# Patient Record
Sex: MALE | Race: WHITE | HISPANIC OR LATINO | URBAN - METROPOLITAN AREA
[De-identification: names, ages, dates, MRNs, and addresses within clinical notes are randomized per-mention and may not be internally consistent; named-entity substitution may affect disease eponyms.]

---

## 2023-04-04 ENCOUNTER — TELEPHONE (OUTPATIENT)
Dept: UROLOGY | Facility: AMBULATORY SURGERY CENTER | Age: 63
End: 2023-04-04

## 2023-04-04 ENCOUNTER — OFFICE VISIT (OUTPATIENT)
Dept: DERMATOLOGY | Facility: CLINIC | Age: 63
End: 2023-04-04

## 2023-04-04 VITALS — BODY MASS INDEX: 43.38 KG/M2 | WEIGHT: 286.2 LBS | HEIGHT: 68 IN | TEMPERATURE: 97.8 F

## 2023-04-04 DIAGNOSIS — R21 RASH: Primary | ICD-10-CM

## 2023-04-04 RX ORDER — GLATIRAMER 40 MG/ML
INJECTION, SOLUTION SUBCUTANEOUS
COMMUNITY
Start: 2020-06-04

## 2023-04-04 RX ORDER — LOSARTAN POTASSIUM 50 MG/1
TABLET ORAL
COMMUNITY
Start: 2023-03-30

## 2023-04-04 RX ORDER — EPINEPHRINE 0.3 MG/.3ML
INJECTION SUBCUTANEOUS
COMMUNITY
Start: 2023-03-24

## 2023-04-04 RX ORDER — ATORVASTATIN CALCIUM 40 MG/1
TABLET, FILM COATED ORAL
COMMUNITY
Start: 2018-04-04

## 2023-04-04 RX ORDER — KETOCONAZOLE 20 MG/ML
SHAMPOO TOPICAL
COMMUNITY
Start: 2018-04-04

## 2023-04-04 RX ORDER — CICLOPIROX 7.7 MG/G
GEL TOPICAL
COMMUNITY
Start: 2017-04-04

## 2023-04-04 RX ORDER — LOSARTAN POTASSIUM 50 MG/1
1 TABLET ORAL DAILY
COMMUNITY
Start: 2021-04-04 | End: 2023-07-05

## 2023-04-04 RX ORDER — TACROLIMUS 1 MG/G
OINTMENT TOPICAL
Qty: 100 G | Refills: 0 | Status: SHIPPED | OUTPATIENT
Start: 2023-04-04

## 2023-04-04 RX ORDER — GLATIRAMER 40 MG/ML
INJECTION, SOLUTION SUBCUTANEOUS
COMMUNITY
Start: 2023-03-20

## 2023-04-04 RX ORDER — ASPIRIN 81 MG/1
81 TABLET, CHEWABLE ORAL
COMMUNITY

## 2023-04-04 NOTE — PROGRESS NOTES
"[facility] Note     Patient Name: Sakshi Lund  Encounter Date: 04/04/23     Have you been cared for by a [facility] Dermatologist in the last 3 years and, if so, which description applies to you? NO  I am considered a \"new\" patient and must complete all patient intake questions  I am MALE/not capable of bearing children  REVIEW OF SYSTEMS:  Have you recently had or currently have any of the following? · Recent fever or chills? No  · Any non-healing wound? No   PAST MEDICAL HISTORY:  Have you personally ever had or currently have any of the following? If \"YES,\" then please provide more detail  · Skin cancer (such as Melanoma, Basal Cell Carcinoma, Squamous Cell Carcinoma? YES, BCC on both sides of nose, removed with MOHS  · Tuberculosis, HIV/AIDS, Hepatitis B or C: No  · Systemic Immunosuppression such as Diabetes, Biologic or Immunotherapy, Chemotherapy, Organ Transplantation, Bone Marrow Transplantation YES, Pre-diabetes and MS  · Radiation Treatment No   FAMILY HISTORY:  Any \"first degree relatives\" (parent, brother, sister, or child) with the following? • Skin Cancer, Pancreatic or Other Cancer? YES, Brother had melanoma and kidney cancer, father had small cell carcinoma and uncle as well   PATIENT EXPERIENCE:    • Do you want the Dermatologist to perform a COMPLETE skin exam today including a clinical examination under the \"bra and underwear\" areas? NO  • If necessary, do we have your permission to call and leave a detailed message on your Preferred Phone number that includes your specific medical information? Yes      Not on File No current outpatient medications on file            • Whom besides the patient is providing clinical information about today's encounter?   o NO ADDITIONAL HISTORIAN (patient alone provided history)    Physical Exam and Assessment/Plan by Diagnosis:    RASH    Physical Exam:  • (Anatomic Location); (Size and Morphological Description); (Differential " Diagnosis):  o Scaly erythematous plaque on the left lateral neck  • Pertinent Positives:  • Pertinent Negatives: Additional History of Present Condition:  Patient is here for rash located on left side of neck  Rash has been present for one month and a half  Patient has used many topicals (Ciclopirox 0 77% Gel) and OTC hydrocortisone to get rid of it but no help  It is itchy at times and does come and go  Patient with history of allergy to mometasone  Patient states today it looks better  Wife states may have been a site of a strap rubbing but otherwise can't think of anything that would touch that area  Assessment and Plan: Does not appear fungal  Appears more eczematous    Based on a thorough discussion of this condition and the management approach to it (including a comprehensive discussion of the known risks, side effects and potential benefits of treatment), the patient (family) agrees to implement the following specific plan:  • Start Tacrolimus 0 1% ointment twice daily for 14 days (chosen due to mometasone allergy)  • Start using Cerave or Cetaphil as a moisturizer twice daily   • Send [facility] message in 14 days with update and photo  • If not resolved, will bring back for biopsy      Scribe Attestation    I,:  Luis Landeros am acting as a scribe while in the presence of the attending physician :       I,:  Valentina Tom MD personally performed the services described in this documentation    as scribed in my presence :

## 2023-04-04 NOTE — TELEPHONE ENCOUNTER
Called and spoke with patient, reports weak stream and b/l flank pain  Pt had chills yesterday but none today  Pt denies nausea or vomiting  Reports for the past 3 days  Reports he has had kidney stones in the past  Pt has history of MS  Appt scheduled

## 2023-04-04 NOTE — PROGRESS NOTES
4/6/2023      Chief Complaint   Patient presents with   • Urinary Frequency         Assessment and Plan    60 y o  male -- New patient    1  Urinary frequency  2  Bilateral flank pain  3  Dysuria  - UA today positive for infection and blood  Will send for culture and micro  Will call with results and antibiotics if needed  - PVR today 37 mL  - CT stone ordered due to symptoms and history of stones  - Return to review  - Call with any questions or concerns in the meantime  - All questions answered; patient understands and agrees with plan     4  Prostate cancer screening  - PSA 0 4 (6/30/22)  - PSA and BECKY in June 2023      History of Present Illness  Ulysses Rodarte is a 60 y o  male new patient here for evaluation of urinary symptoms  Patient with history of nephrolithiasis  Patient states for the past few days he has been having weakened urinary stream, bilateral flank pain, dysuria, urinary frequency  Denies gross hematuria, fever  Did have chills that resolved  Denies prior  surgical manipulation or prior hospitalization  Patient has extensive family history of RCC  Father with prostate cancer  Denies family history of bladder cancer  Denies seeing urology in the past      Review of Systems   Constitutional: Negative for activity change, appetite change, chills and fever  HENT: Negative for congestion and trouble swallowing  Respiratory: Negative for cough and shortness of breath  Cardiovascular: Negative for chest pain, palpitations and leg swelling  Gastrointestinal: Negative for abdominal pain, constipation, diarrhea, nausea and vomiting  Genitourinary: Positive for dysuria, flank pain, frequency and urgency  Negative for difficulty urinating and hematuria  Musculoskeletal: Negative for back pain and gait problem  Skin: Negative for wound  Allergic/Immunologic: Negative for immunocompromised state  Neurological: Negative for dizziness and syncope     Hematological: Does not "bruise/bleed easily  Psychiatric/Behavioral: Negative for confusion  All other systems reviewed and are negative  Vitals  Vitals:    04/06/23 0937   BP: 144/90   Pulse: 66   SpO2: 99%   Weight: 130 kg (286 lb)   Height: 5' 8\" (1 727 m)       Physical Exam  Constitutional:       General: He is not in acute distress  Appearance: Normal appearance  He is not ill-appearing, toxic-appearing or diaphoretic  HENT:      Head: Normocephalic  Nose: No congestion  Eyes:      General: No scleral icterus  Right eye: No discharge  Left eye: No discharge  Conjunctiva/sclera: Conjunctivae normal       Pupils: Pupils are equal, round, and reactive to light  Pulmonary:      Effort: Pulmonary effort is normal    Musculoskeletal:      Cervical back: Normal range of motion  Skin:     General: Skin is warm and dry  Coloration: Skin is not jaundiced or pale  Findings: No bruising, erythema, lesion or rash  Neurological:      General: No focal deficit present  Mental Status: He is alert and oriented to person, place, and time  Mental status is at baseline  Gait: Gait normal    Psychiatric:         Mood and Affect: Mood normal          Behavior: Behavior normal          Thought Content:  Thought content normal          Judgment: Judgment normal            Past History  Past Medical History:   Diagnosis Date   • High blood pressure     2021   • [facility] )     2020     Social History     Socioeconomic History   • Marital status: /Civil Union     Spouse name: None   • Number of children: None   • Years of education: None   • Highest education level: None   Occupational History   • None   Tobacco Use   • Smoking status: Never     Passive exposure: Past   • Smokeless tobacco: Never   Vaping Use   • Vaping Use: Never used   Substance and Sexual Activity   • Alcohol use: Never   • Drug use: Never   • Sexual activity: Yes   Other Topics Concern   • None " Social History Narrative   • None     Social Determinants of Health     Financial Resource Strain: Not on file   Food Insecurity: Not on file   Transportation Needs: Not on file   Physical Activity: Not on file   Stress: Not on file   Social Connections: Not on file   Intimate Partner Violence: Not on file   Housing Stability: Not on file     Social History     Tobacco Use   Smoking Status Never   • Passive exposure: Past   Smokeless Tobacco Never     Family History   Problem Relation Age of Onset   • Cancer Father         clear cell carcinma   • Prostate cancer Father    • Hypertension Father    • Nephrolithiasis Father    • Diabetes Mother    • Hypertension Mother    • Cancer Paternal Uncle         clear cell carcinma   • Cancer Brother         clear cell carcinma   • Hypertension Sister    • Diabetes Sister    • Hyperlipidemia Sister    • Hypertension Sister    • Diabetes Sister    • Hyperlipidemia Sister    • No Known Problems Daughter        The following portions of the patient's history were reviewed and updated as appropriate: allergies, current medications, past medical history, past social history, past surgical history and problem list     Results  Recent Results (from the past 1 hour(s))   POCT urine dip    Collection Time: 04/06/23  9:44 AM   Result Value Ref Range    LEUKOCYTE ESTERASE,UA ++     NITRITE,UA positive     SL AMB POCT UROBILINOGEN 0 2     POCT URINE PROTEIN -      PH,UA 5 0     BLOOD,UA ++     SPECIFIC GRAVITY,UA 1 025     KETONES,UA -     BILIRUBIN,UA -     GLUCOSE, UA -      COLOR,UA Yellow     CLARITY,UA Clear    POCT Measure PVR    Collection Time: 04/06/23 10:07 AM   Result Value Ref Range    POST-VOID RESIDUAL VOLUME, ML POC 37 mL   ]  No results found for: PSA  No results found for: GLUCOSE, CALCIUM, NA, K, CO2, CL, BUN, CREATININE  No results found for: WBC, HGB, HCT, MCV, PLT    Dayna Tate PA-C

## 2023-04-04 NOTE — TELEPHONE ENCOUNTER
Please Triage  New Patient    What is the reason for the patient’s appointment? Pt's wife called and patient has trouble urinating for a few days  Pt states that he has urine but he has irritation and pain in his kidney  No blood in urine  Pt has a history of kidney stones  What office location does the patient prefer? [place]     Imaging/Lab Results: No    Do we accept the patient's insurance or is the patient Self-Pay? Insurance Provider: [address] [place] [address] Type/Number:  Member ID#: Has the patient had any previous Urologist(s)? Yes in [place]     Have patient records been requested? If not are records showing in Epic: yes    Has the patient had any outside testing done? no    Does the patient have a personal history of cancer?  No    Pt can be reached at [phone]

## 2023-04-06 ENCOUNTER — OFFICE VISIT (OUTPATIENT)
Dept: UROLOGY | Facility: CLINIC | Age: 63
End: 2023-04-06

## 2023-04-06 VITALS
BODY MASS INDEX: 43.35 KG/M2 | SYSTOLIC BLOOD PRESSURE: 144 MMHG | WEIGHT: 286 LBS | HEIGHT: 68 IN | HEART RATE: 66 BPM | DIASTOLIC BLOOD PRESSURE: 90 MMHG | OXYGEN SATURATION: 99 %

## 2023-04-06 DIAGNOSIS — R35.0 URINARY FREQUENCY: Primary | ICD-10-CM

## 2023-04-06 DIAGNOSIS — N20.0 NEPHROLITHIASIS: ICD-10-CM

## 2023-04-06 LAB
BACTERIA UR QL AUTO: ABNORMAL /HPF
BILIRUB UR QL STRIP: NEGATIVE
CLARITY UR: ABNORMAL
COLOR UR: YELLOW
GLUCOSE UR STRIP-MCNC: NEGATIVE MG/DL
HGB UR QL STRIP.AUTO: ABNORMAL
KETONES UR STRIP-MCNC: NEGATIVE MG/DL
LEUKOCYTE ESTERASE UR QL STRIP: ABNORMAL
MUCOUS THREADS UR QL AUTO: ABNORMAL
NITRITE UR QL STRIP: POSITIVE
NON-SQ EPI CELLS URNS QL MICRO: ABNORMAL /HPF
PH UR STRIP.AUTO: 6 [PH]
POST-VOID RESIDUAL VOLUME, ML POC: 37 ML
PROT UR STRIP-MCNC: ABNORMAL MG/DL
RBC #/AREA URNS AUTO: ABNORMAL /HPF
SL AMB  POCT GLUCOSE, UA: NORMAL
SL AMB LEUKOCYTE ESTERASE,UA: NORMAL
SL AMB POCT BILIRUBIN,UA: NORMAL
SL AMB POCT BLOOD,UA: NORMAL
SL AMB POCT CLARITY,UA: CLEAR
SL AMB POCT COLOR,UA: YELLOW
SL AMB POCT KETONES,UA: NORMAL
SL AMB POCT NITRITE,UA: POSITIVE
SL AMB POCT PH,UA: 5
SL AMB POCT SPECIFIC GRAVITY,UA: 1.02
SL AMB POCT URINE PROTEIN: NORMAL
SL AMB POCT UROBILINOGEN: 0.2
SP GR UR STRIP.AUTO: 1.02 (ref 1–1.03)
UROBILINOGEN UR STRIP-ACNC: <2 MG/DL
WBC #/AREA URNS AUTO: ABNORMAL /HPF

## 2023-04-06 RX ORDER — ASPIRIN 81 MG/1
TABLET, CHEWABLE ORAL
COMMUNITY
Start: 2002-04-04

## 2023-04-07 ENCOUNTER — TELEPHONE (OUTPATIENT)
Dept: OTHER | Facility: OTHER | Age: 63
End: 2023-04-07

## 2023-04-07 DIAGNOSIS — R35.0 URINARY FREQUENCY: Primary | ICD-10-CM

## 2023-04-07 RX ORDER — CEPHALEXIN 500 MG/1
500 CAPSULE ORAL EVERY 12 HOURS SCHEDULED
Qty: 14 CAPSULE | Refills: 0 | Status: SHIPPED | OUTPATIENT
Start: 2023-04-07 | End: 2023-04-14

## 2023-04-07 NOTE — TELEPHONE ENCOUNTER
Called and spoke to patient  Informed patient abx was sent to pharmacy and may need to be adjusted based on UC final results  Confirmed abx and pharmacy with patient  Patient verbalized understanding

## 2023-04-08 LAB — BACTERIA UR CULT: ABNORMAL

## 2023-04-10 PROBLEM — E66.01 MORBID OBESITY WITH BMI OF 40.0-44.9, ADULT (HCC): Status: ACTIVE | Noted: 2023-04-10

## 2023-04-10 PROBLEM — G35 MULTIPLE SCLEROSIS (HCC): Status: ACTIVE | Noted: 2023-04-10

## 2023-04-10 NOTE — TELEPHONE ENCOUNTER
Patient and wife instructed to continue antibiotics as prescribed per provider  Both expressed understanding  Patient educated on the use of probiotics and adequate hydration

## 2023-04-10 NOTE — TELEPHONE ENCOUNTER
Pt wife calling to see if abx need to be adjusted based on culture results       Please call back at  [phone] or [phone]

## 2023-05-01 ENCOUNTER — NURSE TRIAGE (OUTPATIENT)
Dept: OTHER | Facility: OTHER | Age: 63
End: 2023-05-01

## 2023-05-01 ENCOUNTER — APPOINTMENT (OUTPATIENT)
Dept: LAB | Facility: HOSPITAL | Age: 63
End: 2023-05-01

## 2023-05-01 DIAGNOSIS — R39.89 SUSPECTED UTI: ICD-10-CM

## 2023-05-01 DIAGNOSIS — R21 RASH: ICD-10-CM

## 2023-05-01 RX ORDER — TACROLIMUS 1 MG/G
OINTMENT TOPICAL
Qty: 100 G | Refills: 0 | Status: SHIPPED | OUTPATIENT
Start: 2023-05-01

## 2023-05-01 NOTE — TELEPHONE ENCOUNTER
"Regarding: trouble urinating, back pain, pain with urination  ----- Message from Kristian Garcia sent at 5/1/2023  8:24 AM EDT -----  \" My  is having trouble urinating, he is having pain with urination and he only gets a trickle, he is having lower back pain   He needs an appt today\"    "

## 2023-05-01 NOTE — TELEPHONE ENCOUNTER
"Patient recently treated for kidney stone that he passed at home  Patient states he is now having symptoms of a UTI  Patient c/o low back pain, pain with urination and burning that started 4/26  Patient wants to know if he needs go be seen in office or if he can just be prescribed another antibiotic  Please follow up  Reason for Disposition   Side (flank) or lower back pain present    Answer Assessment - Initial Assessment Questions  1  SYMPTOM: \"What's the main symptom you're concerned about? \" (e g , frequency, incontinence)      Lower back pain, pain with urination, burning    2  ONSET: \"When did the symptoms start? \"      4/26    3  PAIN: \"Is there any pain? \" If Yes, ask: \"How bad is it? \" (Scale: 1-10; mild, moderate, severe)     Moderate pain    4  CAUSE: \"What do you think is causing the symptoms? \"      UTI    5  OTHER SYMPTOMS: \"Do you have any other symptoms? \" (e g , fever, flank pain, blood in urine, pain with urination)      Denies    6  PREGNANCY: \"Is there any chance you are pregnant? \" \"When was your last menstrual period? \"      N/A    Last week passed a small stone    Protocols used: URINARY SYMPTOMS-ADULT-AH    "

## 2023-05-01 NOTE — TELEPHONE ENCOUNTER
Pt;s wife called returned a call to the nurse       Call transferred to [facility]

## 2023-05-02 ENCOUNTER — PROCEDURE VISIT (OUTPATIENT)
Dept: UROLOGY | Facility: CLINIC | Age: 63
End: 2023-05-02

## 2023-05-02 ENCOUNTER — APPOINTMENT (OUTPATIENT)
Dept: LAB | Facility: HOSPITAL | Age: 63
End: 2023-05-02

## 2023-05-02 VITALS
BODY MASS INDEX: 43.19 KG/M2 | DIASTOLIC BLOOD PRESSURE: 88 MMHG | SYSTOLIC BLOOD PRESSURE: 138 MMHG | HEART RATE: 92 BPM | WEIGHT: 285 LBS | HEIGHT: 68 IN

## 2023-05-02 DIAGNOSIS — R39.89 SUSPECTED UTI: Primary | ICD-10-CM

## 2023-05-02 DIAGNOSIS — R39.89 SUSPECTED UTI: ICD-10-CM

## 2023-05-02 DIAGNOSIS — R35.0 URINARY FREQUENCY: ICD-10-CM

## 2023-05-02 LAB
ANION GAP SERPL CALCULATED.3IONS-SCNC: 7 MMOL/L (ref 4–13)
BUN SERPL-MCNC: 21 MG/DL (ref 5–25)
CALCIUM SERPL-MCNC: 9.7 MG/DL (ref 8.4–10.2)
CHLORIDE SERPL-SCNC: 102 MMOL/L (ref 96–108)
CO2 SERPL-SCNC: 30 MMOL/L (ref 21–32)
CREAT SERPL-MCNC: 0.96 MG/DL (ref 0.6–1.3)
GFR SERPL CREATININE-BSD FRML MDRD: 83 ML/MIN/1.73SQ M
GLUCOSE P FAST SERPL-MCNC: 140 MG/DL (ref 65–99)
POST-VOID RESIDUAL VOLUME, ML POC: 33 ML
POTASSIUM SERPL-SCNC: 4.2 MMOL/L (ref 3.5–5.3)
SODIUM SERPL-SCNC: 139 MMOL/L (ref 135–147)

## 2023-05-02 RX ORDER — LOSARTAN POTASSIUM AND HYDROCHLOROTHIAZIDE 12.5; 5 MG/1; MG/1
TABLET ORAL
COMMUNITY
Start: 2023-04-13

## 2023-05-02 RX ORDER — SULFAMETHOXAZOLE AND TRIMETHOPRIM 800; 160 MG/1; MG/1
1 TABLET ORAL EVERY 12 HOURS SCHEDULED
Qty: 14 TABLET | Refills: 0 | Status: SHIPPED | OUTPATIENT
Start: 2023-05-02 | End: 2023-05-09

## 2023-05-02 NOTE — PROGRESS NOTES
"5/2/2023  Leila Loja is a 60 y o  male  [id number]    Diagnosis:  Chief Complaint    Urinary Tract Infection         Patient presents for follow up post void residual managed by our office    Plan:    -follow up as scheduled for CT and follow up appointment  -CT was changed to CT renal protocol due to patient mentioning brother, father, uncle all had renal cell carcinoma  Father and uncle had passed away from disease    -monitor for final urine testing        Assessment:    Patient sent [facility] message yesterday stating he felt bloating and unable to completely empty bladder  Offered appointment today  Patient stated feeling better and urinating well  UA came back nitrate negative but did show innumerable bacteria  Gavin MURRIETA-CASEY placed patient antibiotic, patient may need antibiotics switched once culture is final  Patient agreeable to this  Vitals:    05/02/23 0818   BP: 138/88   Pulse: 92   Weight: 129 kg (285 lb)   Height: 5' 8\" (1 727 m)           Patient voided in the office    Post void residual measured via bladder scanner to be 33 mL    Recent Results (from the past 6 hour(s))   POCT Measure PVR    Collection Time: 05/02/23  8:24 AM   Result Value Ref Range    POST-VOID RESIDUAL VOLUME, ML POC 33 mL         Batool Grimaldo LPN      "

## 2023-05-03 ENCOUNTER — TELEPHONE (OUTPATIENT)
Dept: UROLOGY | Facility: CLINIC | Age: 63
End: 2023-05-03

## 2023-05-03 LAB — BACTERIA UR CULT: ABNORMAL

## 2023-05-03 NOTE — TELEPHONE ENCOUNTER
----- Message from Chet Woods PA-C sent at 5/3/2023 10:54 AM EDT -----  Continue antibiotics as prescribed

## 2023-05-12 ENCOUNTER — TELEPHONE (OUTPATIENT)
Dept: UROLOGY | Facility: CLINIC | Age: 63
End: 2023-05-12

## 2023-05-12 NOTE — TELEPHONE ENCOUNTER
Called pt to cancel his appt for Monday  Because his CT scan was rescheduled due to not being authorized through his insurance  He said it is rescheduled, but it isn't really clear if it is approved for that appt or not - he said he is surprised with his extensive family  history of prostate cancer   He said he also forgot to mention he is subseptale to blood clots   Would he be able to get an update on the authorization of his CT scan scheduled for 6/7/23   Thank you

## 2023-05-29 DIAGNOSIS — R21 RASH: ICD-10-CM

## 2023-05-30 RX ORDER — TACROLIMUS 1 MG/G
OINTMENT TOPICAL
Qty: 100 G | Refills: 0 | Status: SHIPPED | OUTPATIENT
Start: 2023-05-30

## 2023-06-07 ENCOUNTER — HOSPITAL ENCOUNTER (OUTPATIENT)
Dept: CT IMAGING | Facility: CLINIC | Age: 63
Discharge: HOME | End: 2023-06-07
Payer: MEDICARE

## 2023-06-07 DIAGNOSIS — N20.0 NEPHROLITHIASIS: ICD-10-CM

## 2023-06-07 PROCEDURE — G1004 CDSM NDSC: HCPCS

## 2023-06-07 PROCEDURE — 74176 CT ABD & PELVIS W/O CONTRAST: CPT

## 2023-09-16 ENCOUNTER — APPOINTMENT (OUTPATIENT)
Dept: RADIOLOGY | Facility: CLINIC | Age: 63
End: 2023-09-16
Payer: MEDICARE

## 2023-09-16 ENCOUNTER — OFFICE VISIT (OUTPATIENT)
Dept: URGENT CARE | Facility: CLINIC | Age: 63
End: 2023-09-16
Payer: MEDICARE

## 2023-09-16 VITALS
OXYGEN SATURATION: 95 % | TEMPERATURE: 98.1 F | HEART RATE: 79 BPM | SYSTOLIC BLOOD PRESSURE: 153 MMHG | RESPIRATION RATE: 18 BRPM | DIASTOLIC BLOOD PRESSURE: 77 MMHG
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
## 2023-09-16 DIAGNOSIS — M25.561 ACUTE PAIN OF RIGHT KNEE: ICD-10-CM

## 2023-09-16 DIAGNOSIS — M25.561 ACUTE PAIN OF RIGHT KNEE: Primary | ICD-10-CM

## 2023-09-16 PROCEDURE — 73564 X-RAY EXAM KNEE 4 OR MORE: CPT

## 2023-09-16 PROCEDURE — G0463 HOSPITAL OUTPT CLINIC VISIT: HCPCS

## 2023-09-16 PROCEDURE — 99213 OFFICE O/P EST LOW 20 MIN: CPT

## 2023-09-16 RX ORDER — CHLORHEXIDINE GLUCONATE ORAL RINSE 1.2 MG/ML
SOLUTION DENTAL
COMMUNITY
Start: 2023-07-27

## 2023-09-16 NOTE — PROGRESS NOTES
[facility]        NAME: Annette Anne is a 60 y.o. male  : 1960    MRN: [id number]  DATE: 2023  TIME: 9:09 AM    Assessment and Plan   Acute pain of right knee [M25.561]  1. Acute pain of right knee  XR knee 4+ vw right injury    Ambulatory Referral to Orthopedic Surgery      2. Fall, initial encounter  XR knee 4+ vw right injury    Ambulatory Referral to Orthopedic Surgery        Xray appears negative for any fracture. Will follow up with radiologist report when available. Wrapped with ace bandage and given walker. Patient Instructions     Tylenol/Motrin as needed for pain. Ice/elevate. Ace bandage for compression/support. Ortho referral placed. Follow up with PCP in 3-5 days. Proceed to the ER with worsening symptoms. Chief Complaint     Chief Complaint   Patient presents with   • Leg Pain     Yesterday patient fell off of 3 step ladder, fell  about 3ft onto right leg. Having pain to right knee and right side of leg. 10/10 pain when walking on it. Did not hit his head, no LOC. History of Present Illness       The patient presents today with complaints of R knee pain that started yesterday. He states he was on a ladder and fell about 3 feet directly onto his R knee. He is having pain to the R lateral aspect of his knee with radiation up into his lateral thigh and down to his lateral lower leg. Denies skin color changes. He is also having increased pain with weight bearing. He has a history of MS and DVT. Denies calf tenderness. Denies any other injuries including LOC or hitting his head, or history of previous knee surgery, or fracture. Review of Systems   Review of Systems   Constitutional: Negative for chills and fever. Gastrointestinal: Negative for abdominal pain. Musculoskeletal: Positive for arthralgias. Negative for myalgias. Back pain: R knee. Skin: Negative for color change, rash and wound.    Neurological: Negative for dizziness and headaches. Current Medications       Current Outpatient Medications:   •  aspirin 81 mg chewable tablet, Chew 81 mg, Disp: , Rfl:   •  atorvastatin (LIPITOR) 40 mg tablet, , Disp: , Rfl:   •  chlorhexidine (PERIDEX) 0.12 % solution, USE TWICE A DAY AS DIRECTED (DONT SWALLOW), Disp: , Rfl:   •  Cholecalciferol 125 MCG/ML LIQD, Take 5,000 Units by mouth, Disp: , Rfl:   •  Ciclopirox 0.77 % gel, , Disp: , Rfl:   •  EPINEPHrine (EPIPEN) 0.3 mg/0.3 mL SOAJ, , Disp: , Rfl:   •  Glatiramer Acetate 40 MG/ML SOSY, INJECT ONE SYRINGE SUBCUTANEOUSLY 3 TIMES A WEEK AT LEAST 48 HOURS APART. ALLOW TO WARM TO ROOM TEMP FOR 20 MINUTES.  REFRIGERATE., Disp: , Rfl:   •  Glatiramer Acetate 40 MG/ML SOSY, , Disp: , Rfl:   •  ketoconazole (NIZORAL) 2 % shampoo, Apply topically, Disp: , Rfl:   •  losartan-hydrochlorothiazide (HYZAAR) 50-12.5 mg per tablet, , Disp: , Rfl:   •  losartan (COZAAR) 50 mg tablet, , Disp: , Rfl:   •  losartan (COZAAR) 50 mg tablet, Take 1 tablet by mouth daily (Patient not taking: Reported on 9/16/2023), Disp: , Rfl:   •  tacrolimus (PROTOPIC) 0.1 % ointment, APPLY TOPICALLY TWICE DAILY FOR 14 DAYS (Patient not taking: Reported on 9/16/2023), Disp: 100 g, Rfl: 0    Current Allergies     Allergies as of 09/16/2023 - Reviewed 09/16/2023   Allergen Reaction Noted   • Mometasone Other (See Comments) 12/04/2019            The following portions of the patient's history were reviewed and updated as appropriate: allergies, current medications, past family history, past medical history, past social history, past surgical history and problem list.     Past Medical History:   Diagnosis Date   • High blood pressure     2021   • MS (multiple sclerosis) ([address])     2020       Past Surgical History:   Procedure Laterality Date   • BUNIONECTOMY Right     2002   • CYST REMOVAL      1990's   • FL MYELOGRAM LUMBAR  2/4/2020   • NOSTRIL RETAINER INSERTION Bilateral     1990's       Family History   Problem Relation Age of Onset   • Cancer Father         clear cell carcinma   • Prostate cancer Father    • Hypertension Father    • Nephrolithiasis Father    • Diabetes Mother    • Hypertension Mother    • Cancer Paternal Uncle         clear cell carcinma   • Cancer Brother         clear cell carcinma   • Hypertension Sister    • Diabetes Sister    • Hyperlipidemia Sister    • Hypertension Sister    • Diabetes Sister    • Hyperlipidemia Sister    • No Known Problems Daughter          Medications have been verified. Objective   /77   Pulse 79   Temp 98.1 °F (36.7 °C)   Resp 18   SpO2 95%        Physical Exam     Physical Exam  Vitals and nursing note reviewed. Constitutional:       General: He is not in acute distress. Appearance: Normal appearance. He is not ill-appearing. HENT:      Head: Normocephalic and atraumatic. Right Ear: External ear normal.      Left Ear: External ear normal.      Nose: Nose normal.      Mouth/Throat:      Lips: Pink. Mouth: Mucous membranes are moist.   Eyes:      General: Vision grossly intact. Extraocular Movements: Extraocular movements intact. Pupils: Pupils are equal, round, and reactive to light. Cardiovascular:      Rate and Rhythm: Normal rate. Pulmonary:      Effort: Pulmonary effort is normal.   Musculoskeletal:      Cervical back: Normal range of motion. Right knee: No swelling, erythema or ecchymosis. Decreased range of motion. Tenderness present over the lateral joint line. Normal pulse. Left knee: Normal.   Skin:     General: Skin is warm. Findings: No abrasion, bruising, ecchymosis, rash or wound. Neurological:      Mental Status: He is alert and oriented to person, place, and time. Motor: Motor function is intact. Gait: Gait is intact.    Psychiatric:         Attention and Perception: Attention normal.         Mood and Affect: Mood normal.

## 2023-09-16 NOTE — PATIENT INSTRUCTIONS
Tylenol/Motrin as needed for pain. Ice/elevate. Ace bandage for compression/support. Ortho referral placed. Follow up with PCP in 3-5 days. Proceed to the ER with worsening symptoms. Knee Pain   WHAT YOU NEED TO KNOW:   What do I need to know about knee pain? Knee pain may start suddenly, or it may be a long-term problem. You may have pain on the side, front, or back of your knee. You may have knee stiffness and swelling. You may hear popping sounds or feel like your knee is giving way or locking up as you walk. You may feel pain when you sit, stand, walk, or climb up and down stairs. What increases my risk for knee pain? Obesity    A strain or tear in ligaments or tendons    A leg fracture or knee dislocation    Overuse of your knee    Osteoarthritis, rheumatoid arthritis, or gout    An infection, tumor, or cyst in your knee    Shoes that are not supportive, or training on a hard surface    Sports that involve jumping or pivoting on your knee    How is the cause of knee pain diagnosed? Your healthcare provider will examine your knee and ask about your symptoms. Tell your provider when the pain started and what you were doing at the time. Describe the pain, such as sharp, throbbing, or achy. Tell your provider about any knee injury or surgery you had. You may need any of the following:  MRI, CT, or ultrasound  pictures may show an injury, fracture, or tumor. Blood tests  may be used to check the level of inflammation in your blood. The tests may also show signs of infection. Arthroscopy  is a procedure to look inside your knee joint with an arthroscope. An arthroscope is a flexible tube with a light and camera on the end. A knee arthroscopy is usually done to check for disease or damage inside your knee. These problems may be fixed during the procedure. How is knee pain treated? Treatment will depend on the cause of your pain.  You may need any of the following:  NSAIDs  help decrease swelling and pain or fever. This medicine is available with or without a doctor's order. NSAIDs can cause stomach bleeding or kidney problems in certain people. If you take blood thinner medicine, always ask your healthcare provider if NSAIDs are safe for you. Always read the medicine label and follow directions. Acetaminophen  decreases pain and fever. It is available without a doctor's order. Ask how much to take and how often to take it. Follow directions. Read the labels of all other medicines you are using to see if they also contain acetaminophen, or ask your doctor or pharmacist. Acetaminophen can cause liver damage if not taken correctly. Do not use more than 4 grams (4,000 milligrams) total of acetaminophen in one day. Prescription pain medicine  may be given. Ask your healthcare provider how to take this medicine safely. Some prescription pain medicines contain acetaminophen. Do not take other medicines that contain acetaminophen without talking to your healthcare provider. Too much acetaminophen may cause liver damage. Prescription pain medicine may cause constipation. Ask your healthcare provider how to prevent or treat constipation. Steroid injections  may be given into your knee. Steroids reduce inflammation and pain. Surgery  may be used for some injuries, such as to repair a torn ACL. What can I do to manage my symptoms? Rest your knee so it can heal.  Limit activities that increase your pain. Do low-impact exercises, such as walking or swimming. Apply ice to help reduce swelling and pain. Use an ice pack, or put crushed ice in a plastic bag. Cover it with a towel before you apply it to your knee. Apply ice for 15 to 20 minutes every hour, or as directed. Apply compression to help reduce swelling. Use a brace or bandage only as directed. Elevate your knee to help decrease pain and swelling. Elevate your knee while you are sitting or lying down.  Prop your leg on pillows to keep your knee above the level of your heart. Prevent your knee from moving as directed. Your healthcare provider may put on a cast or splint. You may need to wear a leg brace to stabilize your knee. A leg brace can be adjusted to increase your range of motion as your knee heals. What can I do to prevent knee pain? Maintain a healthy weight. Extra weight increases your risk for knee pain. Ask your healthcare provider how much you should weigh. He or she can help you create a safe weight loss plan if you need to lose weight. Exercise or train properly. Use the correct equipment for sports. Wear shoes that provide good support. Check your posture often as you exercise, play sports, or train for an event. This can help prevent stress and strain on your knees. Rest between sessions so you do not overwork your knees. When should I seek immediate care? Your pain is worse, even after treatment. You cannot bend or straighten your leg completely. The swelling around your knee does not go down even with treatment. Your knee is painful and hot to the touch. When should I contact my healthcare provider? You have questions or concerns about your condition or care. CARE AGREEMENT:   You have the right to help plan your care. Learn about your health condition and how it may be treated. Discuss treatment options with your healthcare providers to decide what care you want to receive. You always have the right to refuse treatment. The above information is an  only. It is not intended as medical advice for individual conditions or treatments. Talk to your doctor, nurse or pharmacist before following any medical regimen to see if it is safe and effective for you. © Copyright [employer] 2022 Information is for End User's use only and may not be sold, redistributed or otherwise used for commercial purposes.  All illustrations and images included in CareNotes® are the copyrighted property of [employer] or [address] [place] [address]

## 2023-09-19 ENCOUNTER — OFFICE VISIT (OUTPATIENT)
Dept: OBGYN CLINIC | Facility: CLINIC | Age: 63
End: 2023-09-19
Payer: MEDICARE

## 2023-09-19 VITALS
SYSTOLIC BLOOD PRESSURE: 126 MMHG | HEIGHT: 68 IN | BODY MASS INDEX: 42.98 KG/M2 | WEIGHT: 283.6 LBS | DIASTOLIC BLOOD PRESSURE: 81 MMHG | HEART RATE: 88 BPM

## 2023-09-19 DIAGNOSIS — M25.561 ACUTE PAIN OF RIGHT KNEE: ICD-10-CM
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
## 2023-09-19 DIAGNOSIS — S80.01XA CONTUSION OF RIGHT KNEE, INITIAL ENCOUNTER: Primary | ICD-10-CM

## 2023-09-19 PROCEDURE — 99203 OFFICE O/P NEW LOW 30 MIN: CPT | Performed by: ORTHOPAEDIC SURGERY

## 2023-09-19 NOTE — PROGRESS NOTES
Orthopaedics Office Visit - initial  Patient Visit    ASSESSMENT/PLAN:    Assessment:   Right knee contusion   Ligamentously stable    Plan:     · PT referral was given to patient today  · Continue bracing and rest ice elevate   · If patient continues to have pain over next 2 weeks they may initiate PT       _____________________________________________________  CHIEF COMPLAINT:  Chief Complaint   Patient presents with   • Right Knee - Pain         SUBJECTIVE:  Trang Paul is a 60 y.o. male who presents today for initial evaluation of right knee pain. Patient states on 9/15/23 he fell off 3 step ladder onto his right leg. Patient reports he had sharp burning pain down the lateral aspect of right leg as well as tremendous swelling down the right leg. Patient states he uses a cane to ambulate due to his MS and has been using Ace bandages and a brace that he found online. Patient states steps in knee flexion aggravate his pain and rest alleviates his pain. Patient has been using ice, Tylenol, and rest for treatment. Patient reports no previous injuries or surgeries to the right knee.         PAST MEDICAL HISTORY:  Past Medical History:   Diagnosis Date   • High blood pressure     2021   • MS (multiple sclerosis) ([address])     2020       PAST SURGICAL HISTORY:  Past Surgical History:   Procedure Laterality Date   • BUNIONECTOMY Right     2002   • CYST REMOVAL      1990's   • FL MYELOGRAM LUMBAR  2/4/2020   • NOSTRIL RETAINER INSERTION Bilateral     1990's       FAMILY HISTORY:  Family History   Problem Relation Age of Onset   • Cancer Father         clear cell carcinma   • Prostate cancer Father    • Hypertension Father    • Nephrolithiasis Father    • Diabetes Mother    • Hypertension Mother    • Cancer Paternal Uncle         clear cell carcinma   • Cancer Brother         clear cell carcinma   • Hypertension Sister    • Diabetes Sister    • Hyperlipidemia Sister    • Hypertension Sister    • Diabetes Sister    • Hyperlipidemia Sister    • No Known Problems Daughter        SOCIAL HISTORY:  Social History     Tobacco Use   • Smoking status: Never     Passive exposure: Past   • Smokeless tobacco: Never   Vaping Use   • Vaping Use: Never used   Substance Use Topics   • Alcohol use: Never   • Drug use: Never       MEDICATIONS:    Current Outpatient Medications:   •  aspirin 81 mg chewable tablet, Chew 81 mg, Disp: , Rfl:   •  atorvastatin (LIPITOR) 40 mg tablet, , Disp: , Rfl:   •  chlorhexidine (PERIDEX) 0.12 % solution, USE TWICE A DAY AS DIRECTED (DONT SWALLOW), Disp: , Rfl:   •  Cholecalciferol 125 MCG/ML LIQD, Take 5,000 Units by mouth, Disp: , Rfl:   •  Ciclopirox 0.77 % gel, , Disp: , Rfl:   •  EPINEPHrine (EPIPEN) 0.3 mg/0.3 mL SOAJ, , Disp: , Rfl:   •  Glatiramer Acetate 40 MG/ML SOSY, INJECT ONE SYRINGE SUBCUTANEOUSLY 3 TIMES A WEEK AT LEAST 48 HOURS APART. ALLOW TO WARM TO ROOM TEMP FOR 20 MINUTES. REFRIGERATE., Disp: , Rfl:   •  Glatiramer Acetate 40 MG/ML SOSY, , Disp: , Rfl:   •  ketoconazole (NIZORAL) 2 % shampoo, Apply topically, Disp: , Rfl:   •  losartan-hydrochlorothiazide (HYZAAR) 50-12.5 mg per tablet, , Disp: , Rfl:   •  losartan (COZAAR) 50 mg tablet, , Disp: , Rfl:   •  losartan (COZAAR) 50 mg tablet, Take 1 tablet by mouth daily (Patient not taking: Reported on 9/16/2023), Disp: , Rfl:   •  tacrolimus (PROTOPIC) 0.1 % ointment, APPLY TOPICALLY TWICE DAILY FOR 14 DAYS (Patient not taking: Reported on 9/16/2023), Disp: 100 g, Rfl: 0    ALLERGIES:  Allergies   Allergen Reactions   • Mometasone Other (See Comments)     Throat Irritation       REVIEW OF SYSTEMS:  MSK: Right knee pain  Neuro: Normal  Pertinent items are otherwise noted in HPI. A comprehensive review of systems was otherwise negative.     LABS:  HgA1c: No results found for: "HGBA1C"  BMP:   Lab Results   Component Value Date    CALCIUM 9.7 05/02/2023    K 4.2 05/02/2023    CO2 30 05/02/2023     05/02/2023    BUN 21 05/02/2023 CREATININE 0.96 05/02/2023     CBC: No components found for: "CBC"    _____________________________________________________  PHYSICAL EXAMINATION:  Vital signs: /81   Pulse 88   Ht 5' 8" (1.727 m)   Wt 129 kg (283 lb 9.6 oz)   BMI 43.12 kg/m²   General: No acute distress, awake and alert  Psychiatric: Mood and affect appear appropriate  HEENT: Trachea Midline, No torticollis, no apparent facial trauma  Cardiovascular: No audible murmurs;  Extremities appear perfused  Pulmonary: No audible wheezing or stridor  Skin: No open lesions; see further details (if any) below    MUSCULOSKELETAL EXAMINATION:  Extremities:   Right Knee:  Mild Swelling   TTP LCL and Lateral joint Line  AROM 0-100  PROM 0-110   Stable varus and valgus    _____________________________________________________  STUDIES REVIEWED:  I personally reviewed the images and interpretation is as follows:  Plain films of R knee reveal mild degen changes, no fracture or dislocation    PROCEDURES PERFORMED:  Procedures   none    Scribe Attestation    I,:  Behzad Doty am acting as a scribe while in the presence of the attending physician.:       I,:  Kirill Blanton MD personally performed the services described in this documentation    as scribed in my presence.:

## 2023-10-11 ENCOUNTER — EVALUATION (OUTPATIENT)
Dept: PHYSICAL THERAPY | Facility: CLINIC | Age: 63
End: 2023-10-11
Payer: MEDICARE

## 2023-10-11 DIAGNOSIS — S80.01XD CONTUSION OF RIGHT KNEE, SUBSEQUENT ENCOUNTER: ICD-10-CM

## 2023-10-11 DIAGNOSIS — M25.561 ACUTE PAIN OF RIGHT KNEE: Primary | ICD-10-CM

## 2023-10-11 PROCEDURE — 97161 PT EVAL LOW COMPLEX 20 MIN: CPT

## 2023-10-11 PROCEDURE — 97110 THERAPEUTIC EXERCISES: CPT

## 2023-10-11 NOTE — PROGRESS NOTES
PT Evaluation     Today's date: 10/11/2023  Patient name: Trang Paul  : 1960  MRN: [id number]  Referring provider: Millicent Shearer MD  Dx:   Encounter Diagnosis     ICD-10-CM    1. Acute pain of right knee  M25.561 Ambulatory Referral to Physical Therapy      2. Contusion of right knee, subsequent encounter  S80. 01XD Ambulatory Referral to Physical Therapy                     Assessment  Assessment details: Trang Paul is a 58 y.o. male presenting to physical therapy for an initial evaluation with a MD referral of acute pain of right knee. The patient demonstrates WNL knee ROM and is ambulatory with a SPC which is his baseline function. He does demonstrate decreased hip and knee strength, balance deficits, and mild swelling in the suprapatellar area. He continues to wear his knee brace outside of his home for added stability, however has been able to wean out of this at home. These deficits have limited the patient's ability to complete ADLs, community ambulation, and recreational activities of outdoor walks on inclines/declines. This patient would benefit from OP PT services to address their impairments and functional limitations to maximize functional mobility. The patient was provided a HEP and demonstrated/verbalized understanding it's completion. Impairments: activity intolerance, impaired balance, impaired physical strength, lacks appropriate home exercise program, pain with function and weight-bearing intolerance    Symptom irritability: moderateUnderstanding of Dx/Px/POC: excellent   Prognosis: good    Goals  STG to be achieved in 4 weeks: The patient will report no pain with usual activities. The patient will increase right hip abduction MMT score to at least 4/5 to allow for improved functional mobility. LTG to be achieved by DC: The patient will be independent in comprehensive HEP.    The patient will increase right knee extension and flexion MMT score to [facility] to allow for improved functional mobility. The patient will be able to return to his 2-3 mile walks without pain or difficulty due to his knee. The patient will report improved balance and stability to his baseline before his fall. Plan  Patient would benefit from: skilled physical therapy  Planned modality interventions: thermotherapy: hydrocollator packs, TENS and cryotherapy  Planned therapy interventions: manual therapy, joint mobilization, balance/weight bearing training, neuromuscular re-education, patient education, flexibility, strengthening, stretching, therapeutic activities, therapeutic exercise, gait training and home exercise program  Frequency: 2x week  Duration in weeks: 8  Plan of Care beginning date: 10/11/2023  Plan of Care expiration date: 12/6/2023  Treatment plan discussed with: patient        Subjective Evaluation    History of Present Illness  Mechanism of injury:  Rodrick Cobian presents to therapy with a chief complaint of right knee pain ongoing since a fall off a 3 step ladder on 9/15/23. He presented to Urgent Care on 9/16/23 where they performed Xrays. Imaging revealed "no acute fracture or dislocation. Suprapatellar fullness suggesting at least moderate joint effusion. No significant degenerative changes". He was provided a walker, ace bandage, and referred to orthopedics. He saw orthopedics on 9/19/23 and was advised to continue to use OTC brace, rest, ice, elevate, and use OTC medications as needed for pain. He was also referred to OP PT. He reports that he does have MS and has balance deficits with right sided weakness because of this. At baseline, he ambulates with a SPC due to his balance and is using one at present. He states that his pain is gradually improving, however he does continue to have some pain with pivoting on the right leg. He reports minimal to no difficulty on the stairs at this time due to his knee. He does use bilateral handrails on the stairs because of his balance issues. He notes continued weakness in the knee compared to his baseline strength. He also has swelling just above the right knee cap, however this is largely improved from his swelling immediately after the fall. He has been wearing an OTC knee brace since his fall, however is only wearing it outside of his home at this time. He enjoys doing outdoor walks up/down hills around his home. He has been trying to gradually improve his mileage, with the knee brace on. He was up to 2 miles before his fall and is back to 1.5 miles at this time. Patient Goals  Patient goals for therapy: increased strength  Patient goal: to get my knee back to where it was before the fall  Pain  Current pain ratin  At best pain ratin  At worst pain ratin  Location: lateral aspect of the right knee  Quality: knife-like  Relieving factors: ice, change in position, medications and rest  Aggravating factors: standing, walking and stair climbing  Progression: improved    Social Support  Steps to enter house: yes  Stairs in house: yes   Lives in: multiple-level home  Lives with: spouse    Employment status: not working    Diagnostic Tests  X-ray: normal  Treatments  Previous treatment: medication  Current treatment: physical therapy        Objective     Observations     Additional Observation Details  Mild swelling suprapatellar area on R    Palpation     Right   No palpable tenderness to the distal biceps femoris, distal semimembranosus, distal semitendinosus, lateral gastrocnemius and medial gastrocnemius. Tenderness     Right Knee   No tenderness in the inferior patella, lateral joint line, lateral patella, LCL (distal), LCL (proximal), MCL (distal), MCL (proximal), medial joint line, medial patella, patellar tendon, pes anserinus, popliteal fossa, quadriceps tendon and superior patella.      Active Range of Motion   Left Knee   Flexion: 120 degrees   Extension: 0 degrees     Right Knee   Flexion: 120 degrees   Extension: 0 degrees Mobility   Patellar Mobility:   Left Knee   Hypermobile: left medial, left lateral, left superior and left inferior      Right Knee   Hypermobile: medial, lateral, superior and inferior     Strength/Myotome Testing     Left Hip   Planes of Motion   Flexion: 5  Abduction: 5    Right Hip   Planes of Motion   Flexion: 4-  Abduction: 4-    Left Knee   Flexion: 5  Extension: 5    Right Knee   Flexion: 4  Extension: 4    Tests     Right Knee   Negative anterior drawer, lateral Ricardo, medial Ricardo, patellar compression, valgus stress test at 0 degrees and varus stress test at 0 degrees.      Ambulation   Weight-Bearing Status   Weight-Bearing Status (Left): full weight bearing   Assistive device used: single point cane    Ambulation: Level Surfaces   Ambulation with assistive device: independent             Precautions: MS- Balance Deficits, HTN  Access Code: [email or web address]    POC expires Unit limit Auth  expiration date PT/OT + Visit Limit?   12/6/23 N/A 12/31 BOMN                 Visit/Unit Tracking  AUTH Status:  Date 10/11              BOMN Used 1               Remaining                     Manuals 10/11                                                                Neuro Re-Ed             Tandem stance             R SLS             Zia step overs fwd and lat             Rockerboard taps fwd/bwd, left/right                                                    Ther Ex             Rec bike vs upright bike             Supine SLR HEP            Sidelying SLR HEP            Standing hip 3 ways with TB HEP no TB            Lateral walks with TB             Monster walks with TB             Matrix knee ext             Matrix hamstring curls              Pt education PT POC, HEP, brace weaning            Ther Activity             Sit to stands HEP            Fwd step ups              Lat step ups              Gait Training                                       Modalities

## 2023-10-13 ENCOUNTER — APPOINTMENT (EMERGENCY)
Dept: RADIOLOGY | Facility: HOSPITAL | Age: 63
End: 2023-10-13
Payer: MEDICARE

## 2023-10-13 ENCOUNTER — HOSPITAL ENCOUNTER (EMERGENCY)
Facility: HOSPITAL | Age: 63
Discharge: HOME | End: 2023-10-13
Attending: EMERGENCY MEDICINE
Payer: MEDICARE

## 2023-10-13 VITALS
SYSTOLIC BLOOD PRESSURE: 159 MMHG | HEART RATE: 94 BPM | RESPIRATION RATE: 18 BRPM | TEMPERATURE: 98.5 F | OXYGEN SATURATION: 97 % | DIASTOLIC BLOOD PRESSURE: 80 MMHG

## 2023-10-13 DIAGNOSIS — S43.402A SPRAIN OF LEFT SHOULDER: Primary | ICD-10-CM

## 2023-10-13 PROCEDURE — 71045 X-RAY EXAM CHEST 1 VIEW: CPT

## 2023-10-13 PROCEDURE — 99284 EMERGENCY DEPT VISIT MOD MDM: CPT | Performed by: EMERGENCY MEDICINE

## 2023-10-13 PROCEDURE — 73030 X-RAY EXAM OF SHOULDER: CPT

## 2023-10-13 RX ORDER — LIDOCAINE 50 MG/G
1 PATCH TOPICAL ONCE
Status: DISCONTINUED | OUTPATIENT
Start: 2023-10-13 | End: 2023-10-13 | Stop reason: HOSPADM

## 2023-10-13 RX ORDER — NAPROXEN 500 MG/1
500 TABLET ORAL EVERY 12 HOURS PRN
Qty: 20 TABLET | Refills: 0 | Status: SHIPPED | OUTPATIENT
Start: 2023-10-13

## 2023-10-13 RX ORDER — KETOROLAC TROMETHAMINE 30 MG/ML
30 INJECTION, SOLUTION INTRAMUSCULAR; INTRAVENOUS ONCE
Status: COMPLETED | OUTPATIENT
Start: 2023-10-13 | End: 2023-10-13

## 2023-10-13 RX ORDER — ACETAMINOPHEN 325 MG/1
650 TABLET ORAL ONCE
Status: COMPLETED | OUTPATIENT
Start: 2023-10-13 | End: 2023-10-13

## 2023-10-13 RX ADMIN — ACETAMINOPHEN 650 MG: 325 TABLET, FILM COATED ORAL at 19:46

## 2023-10-13 RX ADMIN — KETOROLAC TROMETHAMINE 30 MG: 30 INJECTION INTRAMUSCULAR; INTRAVENOUS at 19:46

## 2023-10-13 RX ADMIN — LIDOCAINE 1 PATCH: 50 PATCH CUTANEOUS at 19:46

## 2023-10-13 NOTE — ED PROVIDER NOTES
History  Chief Complaint   Patient presents with    Shoulder Injury     Pt c/o left shoulder pain after lifting a bag of pellets today     Patient is a 65-year-old male with past medical history of multiple sclerosis, hypertension, prediabetes, hyperlipidemia, presents to the emergency department for acute left shoulder injury that occurred at 4 PM today. Patient states that he was lifting 40 pound bags of pellets today and when he lifted one of the bags he felt acute pain in the left shoulder radiating into the left upper chest.  Any type of movement of the left shoulder seems to exacerbate the pain especially abduction of the shoulder joint. He has baseline paresthesia of the left hand due to his MS but denies any worsening of this. Denies any obvious deformity of the left arm or any new weakness. He reports certain movements of the shoulder cause some pain in the left trapezius region. Denies any head or neck injury or pain. He denies any difficulty breathing, palpitations, abdominal pain, nausea, vomiting, near syncope or dizziness, cough, URI symptoms, change in bowel habits, urinary symptoms, new focal neurologic deficits. He has not taken anything for pain as of yet. Patient is right-hand dominant. History provided by:  Patient   used: No    Shoulder Injury  Associated symptoms: no back pain, no fever and no neck pain        Prior to Admission Medications   Prescriptions Last Dose Informant Patient Reported? Taking? Cholecalciferol 125 MCG/ML LIQD  Self Yes No   Sig: Take 5,000 Units by mouth   Ciclopirox 0.77 % gel  Self Yes No   EPINEPHrine (EPIPEN) 0.3 mg/0.3 mL SOAJ  Self Yes No   Glatiramer Acetate 40 MG/ML SOSY  Self Yes No   Sig: INJECT ONE SYRINGE SUBCUTANEOUSLY 3 TIMES A WEEK AT LEAST 48 HOURS APART. ALLOW TO WARM TO ROOM TEMP FOR 20 MINUTES. REFRIGERATE.    Glatiramer Acetate 40 MG/ML SOSY  Self Yes No   aspirin 81 mg chewable tablet  Self Yes No   Sig: Chew 81 mg atorvastatin (LIPITOR) 40 mg tablet  Self Yes No   chlorhexidine (PERIDEX) 0.12 % solution   Yes No   Sig: USE TWICE A DAY AS DIRECTED (DONT SWALLOW)   ketoconazole (NIZORAL) 2 % shampoo  Self Yes No   Sig: Apply topically   losartan (COZAAR) 50 mg tablet  Self Yes No   Patient not taking: Reported on 5/2/2023   losartan (COZAAR) 50 mg tablet  Self Yes No   Sig: Take 1 tablet by mouth daily   Patient not taking: Reported on 9/16/2023   losartan-hydrochlorothiazide (HYZAAR) 50-12.5 mg per tablet  Self Yes No   tacrolimus (PROTOPIC) 0.1 % ointment   No No   Sig: APPLY TOPICALLY TWICE DAILY FOR 14 DAYS   Patient not taking: Reported on 9/16/2023      Facility-Administered Medications: None       Past Medical History:   Diagnosis Date    High blood pressure     2021    MS (multiple sclerosis) ([address] )     2020       Past Surgical History:   Procedure Laterality Date    BUNIONECTOMY Right     2002    CYST REMOVAL      1990's    FL MYELOGRAM LUMBAR  2/4/2020    NOSTRIL RETAINER INSERTION Bilateral     1's       Family History   Problem Relation Age of Onset    Cancer Father         clear cell carcinma    Prostate cancer Father     Hypertension Father     Nephrolithiasis Father     Diabetes Mother     Hypertension Mother     Cancer Paternal Uncle         clear cell carcinma    Cancer Brother         clear cell carcinma    Hypertension Sister     Diabetes Sister     Hyperlipidemia Sister     Hypertension Sister     Diabetes Sister     Hyperlipidemia Sister     No Known Problems Daughter      I have reviewed and agree with the history as documented.     E-Cigarette/Vaping    E-Cigarette Use Never User      E-Cigarette/Vaping Substances    Nicotine No     THC No     CBD No     Flavoring No     Other No     Unknown No      Social History     Tobacco Use    Smoking status: Never     Passive exposure: Past    Smokeless tobacco: Never   Vaping Use    Vaping Use: Never used   Substance Use Topics    Alcohol use: Never    Drug use: Never       Review of Systems   Constitutional:  Negative for fever. HENT:  Negative for congestion, ear pain, rhinorrhea and sore throat. Respiratory:  Negative for cough, chest tightness, shortness of breath and wheezing. Cardiovascular:  Negative for chest pain and palpitations. Gastrointestinal:  Negative for abdominal pain, constipation, diarrhea, nausea and vomiting. Genitourinary:  Negative for dysuria, flank pain, frequency and hematuria. Musculoskeletal:  Positive for arthralgias. Negative for back pain, joint swelling, neck pain and neck stiffness. +Left shoulder pain. Skin:  Negative for color change, pallor, rash and wound. Allergic/Immunologic: Negative for immunocompromised state. Neurological:  Negative for dizziness, syncope, weakness, light-headedness, numbness and headaches. Hematological:  Negative for adenopathy. Does not bruise/bleed easily. Psychiatric/Behavioral:  Negative for confusion, decreased concentration and sleep disturbance. All other systems reviewed and are negative. Physical Exam  Physical Exam  Vitals and nursing note reviewed. Constitutional:       General: He is not in acute distress. Appearance: Normal appearance. He is well-developed. He is not ill-appearing, toxic-appearing or diaphoretic. HENT:      Head: Normocephalic and atraumatic. Right Ear: External ear normal.      Left Ear: External ear normal.      Nose: Nose normal.      Mouth/Throat:      Mouth: Mucous membranes are moist.      Pharynx: Oropharynx is clear. Eyes:      Extraocular Movements: Extraocular movements intact. Conjunctiva/sclera: Conjunctivae normal.   Neck:      Vascular: No JVD. Cardiovascular:      Rate and Rhythm: Normal rate and regular rhythm. Pulses: Normal pulses. Heart sounds: Normal heart sounds. No murmur heard. No friction rub. No gallop.    Pulmonary:      Effort: Pulmonary effort is normal. No respiratory distress. Breath sounds: Normal breath sounds. No wheezing, rhonchi or rales. Comments: +Left upper lateral chest wall tenderness (left pectoralis muscle tenderness). Chest:      Chest wall: Tenderness present. Abdominal:      General: There is no distension. Palpations: Abdomen is soft. Tenderness: There is no abdominal tenderness. There is no guarding or rebound. Musculoskeletal:         General: Tenderness present. No deformity. Cervical back: Normal range of motion and neck supple. No rigidity. Comments: No midline cervical or thoracic spine tenderness. There is tenderness in the left upper chest wall as well as left trapezius muscle with trapezius muscle hypertonicity. There is diffuse left shoulder tenderness. No tenderness in the upper arm, elbow, forearm, wrist or hand. Full range of motion of left elbow and wrist joints. Decreased range of motion with shoulder flexion and abduction. Patient only able to abduct to approximately 30 degrees. Patient able to flex to 80 degrees. 2+ radial and PT pulse. Skin:     General: Skin is warm and dry. Coloration: Skin is not pale. Findings: No erythema or rash. Neurological:      General: No focal deficit present. Mental Status: He is alert and oriented to person, place, and time. Mental status is at baseline. Sensory: Sensory deficit present. Motor: No weakness. Comments: Decreased sensation of the left hand which according to patient is his baseline due to MS.    Psychiatric:         Mood and Affect: Mood normal.         Behavior: Behavior normal.         Vital Signs  ED Triage Vitals [10/13/23 1753]   Temperature Pulse Respirations Blood Pressure SpO2   98.5 °F (36.9 °C) 94 18 159/80 97 %      Temp Source Heart Rate Source Patient Position - Orthostatic VS BP Location FiO2 (%)   Oral Monitor Sitting Right arm --      Pain Score       --           Vitals:    10/13/23 1753   BP: 159/80   Pulse: 94   Patient Position - Orthostatic VS: Sitting         Visual Acuity      ED Medications  Medications   lidocaine (LIDODERM) 5 % patch 1 patch (1 patch Topical Medication Applied 10/13/23 1946)   acetaminophen (TYLENOL) tablet 650 mg (650 mg Oral Given 10/13/23 1946)   ketorolac (TORADOL) injection 30 mg (30 mg Intramuscular Given 10/13/23 1946)       Diagnostic Studies  Results Reviewed       None                   XR chest portable   ED Interpretation by Levy Victoria DO (10/13 2013)   No acute abnormality in the chest.      XR shoulder 2+ views LEFT   ED Interpretation by Levy Victoria DO (10/13 1934)   No acute osseous abnormality. Procedures  Procedures         ED Course                               SBIRT 20yo+      Flowsheet Row Most Recent Value   Initial Alcohol Screen: US AUDIT-C     1. How often do you have a drink containing alcohol? 0 Filed at: 10/13/2023 1754   2. How many drinks containing alcohol do you have on a typical day you are drinking? 0 Filed at: 10/13/2023 1754   3a. Male UNDER 65: How often do you have five or more drinks on one occasion? 0 Filed at: 10/13/2023 1754   Audit-C Score 0 Filed at: 10/13/2023 1754   ENID: How many times in the past year have you. .. Used an illegal drug or used a prescription medication for non-medical reasons? Never Filed at: 10/13/2023 1754                      Medical Decision Making  48-year-old male presents to the ED with left shoulder pain radiating into the left upper chest and left trapezius muscle after he was lifting 40 pound bags of pellets. Suspect shoulder sprain, possible labral tear, rotator cuff strain or tear. Will obtain chest x-ray and x-ray of the left shoulder joint. Will treat symptomatically with IM Toradol, Tylenol, lidocaine patch. Will refer to orthopedic surgery.   Will place patient in a sling for comfort but I did advise patient to refrain from using the sling 24/7 and to try to do small slow movements of the shoulder to avoid adhesive capsulitis. Discussed symptomatic management at home and when to return to the ER. Amount and/or Complexity of Data Reviewed  Radiology: ordered and independent interpretation performed. Decision-making details documented in ED Course. Risk  OTC drugs. Prescription drug management. Disposition  Final diagnoses:   Sprain of left shoulder     Time reflects when diagnosis was documented in both MDM as applicable and the Disposition within this note       Time User Action Codes Description Comment    10/13/2023  7:39 PM Christie Austyn Add [C35.293O] Sprain of left shoulder           ED Disposition       ED Disposition   Discharge    Condition   Stable    Date/Time   Fri Oct 13, 2023 1939    [address] [facility] discharge to home/self care.                    Follow-up Information       Follow up With Specialties Details Why Contact Info Additional Information    Damon Dowling, [address], Nurse Practitioner Schedule an appointment as soon as possible for a visit   [address] [facility] [address]  [address] [facility] Orthopedic Surgery Schedule an appointment as soon as possible for a visit   [address] [place] [address]  [place] [facility], [address] [facility], [facility], [place], [address], [address] Orthopedic Surgery Schedule an appointment as soon as possible for a visit   [address] [address]  [address], [address] Emergency Department Emergency Medicine Go to  If symptoms worsen [address] [facility] [address] [facility] Emergency Department, [place], [place], [address]            Patient's Medications   Discharge Prescriptions    NAPROXEN (NAPROSYN) 500 MG TABLET    Take 1 tablet (500 mg total) by mouth every 12 (twelve) hours as needed for mild pain or moderate pain       Start Date: 10/13/2023End Date: --       Order Dose: 500 mg       Quantity: 20 tablet    Refills: 0           PDMP Review       None            ED Provider  Electronically Signed by             Monica Ann DO  10/13/23 2017

## 2023-10-17 ENCOUNTER — OFFICE VISIT (OUTPATIENT)
Dept: PHYSICAL THERAPY | Facility: CLINIC | Age: 63
End: 2023-10-17
Payer: MEDICARE

## 2023-10-17 DIAGNOSIS — M25.561 ACUTE PAIN OF RIGHT KNEE: Primary | ICD-10-CM

## 2023-10-17 DIAGNOSIS — S80.01XD CONTUSION OF RIGHT KNEE, SUBSEQUENT ENCOUNTER: ICD-10-CM

## 2023-10-17 PROCEDURE — 97110 THERAPEUTIC EXERCISES: CPT

## 2023-10-17 NOTE — PROGRESS NOTES
Daily Note     Today's date: 10/17/2023  Patient name: Luis Alfredo  : 1960  MRN: [id number]  Referring provider: Markell Hubbard MD  Dx:   Encounter Diagnosis     ICD-10-CM    1. Acute pain of right knee  M25.561       2. Contusion of right knee, subsequent encounter  S80. 01XD                      Subjective: Patient reports that he was lifting pellets over the weekend and felt a pop in his shoulder. He states that he went to the ED and they did Xrays which did not show any dislocations or fractures. He is scheduled to see ortho about this. He did not have time to try his HEP from the evaluation because of everything going on with his shoulder. Objective: See treatment diary below      Assessment: Tolerated treatment well. Greatly challenged with semi tandem stance balance due to significant balance impairments. Min A required to stabilize for safety and correction of erratic upper body movements. Reviewed HEP with good understanding demonstrated. Patient demonstrated fatigue post treatment, exhibited good technique with therapeutic exercises, and would benefit from continued PT      Plan: Continue per plan of care.       Precautions: MS- Balance Deficits, HTN  Access Code: [email or web address]    POC expires Unit limit Auth  expiration date PT/OT + Visit Limit?   23 N/A  BOMN                 Visit/Unit Tracking  AUTH Status:  Date 10/11 10/17             BOMN Used 1 2              Remaining                     Manuals 10/11 10/17                                                               Neuro Re-Ed             Semi-Tandem stance  2x30"  Min A           Zia step overs fwd and lat             Rockerboard taps fwd/bwd, left/right  B/L UE   CGA   X20 ea dir                                                  Ther Ex             Rec bike vs upright bike  Rec L1 10'           Supine SLR HEP 2x10            Sidelying SLR HEP 2x10           Standing hip 3 ways with TB HEP no TB X10 B/L UE support Lateral walks with TB  GTB x3 laps B/L UE support           Monster walks with TB             Matrix knee ext  30# 2x10           Matrix hamstring curls   40# 2x10           Pt education PT POC, HEP, brace weaning            Ther Activity             Sit to stands HEP            Fwd step ups              Lat step ups              Gait Training                                       Modalities

## 2023-10-19 ENCOUNTER — OFFICE VISIT (OUTPATIENT)
Dept: PHYSICAL THERAPY | Facility: CLINIC | Age: 63
End: 2023-10-19
Payer: MEDICARE

## 2023-10-19 DIAGNOSIS — S80.01XD CONTUSION OF RIGHT KNEE, SUBSEQUENT ENCOUNTER: ICD-10-CM

## 2023-10-19 DIAGNOSIS — M25.561 ACUTE PAIN OF RIGHT KNEE: Primary | ICD-10-CM

## 2023-10-19 PROCEDURE — 97112 NEUROMUSCULAR REEDUCATION: CPT

## 2023-10-19 PROCEDURE — 97110 THERAPEUTIC EXERCISES: CPT

## 2023-10-19 NOTE — PROGRESS NOTES
Daily Note     Today's date: 10/19/2023  Patient name: Marlin Pichardo  : 1960  MRN: [id number]  Referring provider: Denzel Zhong MD  Dx:   Encounter Diagnosis     ICD-10-CM    1. Acute pain of right knee  M25.561       2. Contusion of right knee, subsequent encounter  S80. 01XD           Start Time: 0800  Stop Time: 0846  Total time in clinic (min): 46 minutes    Subjective: Patient states he got a good workout and experienced R knee soreness after last visit and reports compliance with HEP as well. Otherwise, he denies knee pain and offers no new complaints since last session. Post session, patient stated improved balance, steadiness, and coordination compared to last session and is pleased with results of PT. Objective: See treatment diary below      Assessment: Patient tolerated treatment session well without experiencing any adverse effects. Patient demonstrated improved control and coordination with balance exercises compared to last session. Provided close supervision with rockerboard taps as he exhibited improved balance. Most challenged performing semi-tandem balance as he presents with erratic UE movements and increased swaying in all directions. Patient appropriately challenged and fatigued post session and would benefit from continued PT to improve balance, mobility, and function of RLE. Plan: Continue per POC. Increase reps/resistance as tolerated.       Precautions: MS- Balance Deficits, HTN  Access Code: [email or web address]    POC expires Unit limit Auth  expiration date PT/OT + Visit Limit?   23 N/A  BOMN                 Visit/Unit Tracking  AUTH Status:  Date 10/11 10/17 10/19            BOMN Used 1 2 3             Remaining                     Manuals 10/11 10/17 10/19                                                              Neuro Re-Ed             Semi-Tandem stance  2x30"  Min A 2x30"  Min A          Zia step overs fwd and lat             Rockerboard taps fwd/bwd, left/right  B/L UE   CGA   X20 ea dir B/L UE   CGA   X20 ea dir                                                 Ther Ex             Rec bike vs upright bike  Rec L1 10' Rec L1 10'          Supine SLR HEP 2x10  2x10           Sidelying SLR HEP 2x10 2x10          Standing hip 3 ways with TB HEP no TB X10 B/L UE support  2x10 B/L UE support           Lateral walks with TB  GTB x3 laps B/L UE support GTB x3 laps B/L UE support          Monster walks with TB             Matrix knee ext  30# 2x10 30# 2x10          Matrix hamstring curls   40# 2x10 40# 2x10          Pt education PT POC, HEP, brace weaning            Ther Activity             Sit to stands HEP            Fwd step ups    6" x 10          Lat step ups              Gait Training                                       Modalities

## 2023-10-30 ENCOUNTER — OFFICE VISIT (OUTPATIENT)
Dept: PHYSICAL THERAPY | Facility: CLINIC | Age: 63
End: 2023-10-30
Payer: MEDICARE

## 2023-10-30 DIAGNOSIS — S80.01XD CONTUSION OF RIGHT KNEE, SUBSEQUENT ENCOUNTER: ICD-10-CM

## 2023-10-30 DIAGNOSIS — M25.561 ACUTE PAIN OF RIGHT KNEE: Primary | ICD-10-CM

## 2023-10-30 PROCEDURE — 97110 THERAPEUTIC EXERCISES: CPT

## 2023-10-30 PROCEDURE — 97112 NEUROMUSCULAR REEDUCATION: CPT

## 2023-10-30 NOTE — PROGRESS NOTES
Daily Note     Today's date: 10/30/2023  Patient name: Jefferson Lechuga  : 1960  MRN: [id number]  Referring provider: Rabia Amin MD  Dx:   Encounter Diagnosis     ICD-10-CM    1. Acute pain of right knee  M25.561       2. Contusion of right knee, subsequent encounter  S80. 01XD                      Subjective: Patient reports no complaints to his knee. Objective: See treatment diary below      Assessment: Tolerated treatment well. Progressed patient with additional weight for matrix knee extension and hamstring curls as well as reps for step ups to facilitate improved lower extremity strength. Challenged with progressions due to muscular fatigue, however denied pain in knee. Patient demonstrated fatigue post treatment, exhibited good technique with therapeutic exercises, and would benefit from continued PT      Plan: Progress treatment as tolerated.        Precautions: MS- Balance Deficits, HTN  Access Code: [email or web address]    POC expires Unit limit Auth  expiration date PT/OT + Visit Limit?   23 N/A  BOMN                 Visit/Unit Tracking  AUTH Status:  Date 10/11 10/17 10/19 10/30           BOMN Used 1 2 3 4            Remaining                     Manuals 10/11 10/17 10/19 10/30                                                             Neuro Re-Ed             Semi-Tandem stance  2x30"  Min A 2x30"  Min A          Zia step overs fwd and lat    X3 laps U/L UE on HR, CGA         Rockerboard taps fwd/bwd, left/right  B/L UE   CGA   X20 ea dir B/L UE   CGA   X20 ea dir B/L UE   CGA   X20 ea dir                                                Ther Ex             Rec bike vs upright bike  Rec L1 10' Rec L1 10' Rec L1 10'         Supine SLR HEP 2x10  2x10           Sidelying SLR HEP 2x10 2x10          Standing hip 3 ways with TB HEP no TB X10 B/L UE support  2x10 B/L UE support           Lateral walks with TB  GTB x3 laps B/L UE support GTB x3 laps B/L UE support GTB x3 laps B/L UE support Monster walks with TB    GTB x3 laps U/L UE support          Matrix knee ext  30# 2x10 30# 2x10 40# 3x10         Matrix hamstring curls   40# 2x10 40# 2x10 55# 3x10         Pt education PT POC, HEP, brace weaning            Ther Activity             Sit to stands HEP            Fwd step ups    6" x 10 6" x20          Lat step ups     6"x20          Gait Training                                       Modalities

## 2023-11-01 ENCOUNTER — OFFICE VISIT (OUTPATIENT)
Dept: OBGYN CLINIC | Facility: CLINIC | Age: 63
End: 2023-11-01
Payer: MEDICARE

## 2023-11-01 VITALS
WEIGHT: 283 LBS | HEART RATE: 93 BPM | BODY MASS INDEX: 42.89 KG/M2 | HEIGHT: 68 IN | DIASTOLIC BLOOD PRESSURE: 81 MMHG | SYSTOLIC BLOOD PRESSURE: 127 MMHG

## 2023-11-01 DIAGNOSIS — S46.002A ROTATOR CUFF INJURY, LEFT, INITIAL ENCOUNTER: Primary | ICD-10-CM

## 2023-11-01 PROCEDURE — 99213 OFFICE O/P EST LOW 20 MIN: CPT | Performed by: FAMILY MEDICINE

## 2023-11-01 RX ORDER — ALPRAZOLAM 0.5 MG/1
0.5 TABLET ORAL 3 TIMES DAILY PRN
COMMUNITY
Start: 2023-10-27 | End: 2023-11-26

## 2023-11-01 RX ORDER — AMOXICILLIN 500 MG/1
CAPSULE ORAL
COMMUNITY
Start: 2023-10-30

## 2023-11-01 NOTE — PATIENT INSTRUCTIONS
Over the counter vitamins:    -Turmeric vitamin at least 1000 mg daily    - Tart cherry vitamin at least 1000 mg daily    -Glucosamine-chondrointin 2-3 times daily

## 2023-11-01 NOTE — PROGRESS NOTES
Assessment/Plan:  Assessment/Plan   Diagnoses and all orders for this visit:    Rotator cuff injury, left, initial encounter  -     Ambulatory Referral to Orthopedic Surgery  -     MRI shoulder left wo contrast; Future    Other orders  -     ALPRAZolam (XANAX) 0.5 mg tablet; Take 0.5 mg by mouth Three times daily as needed  -     amoxicillin (AMOXIL) 500 mg capsule        50-year-old male [address]   [address] with onset left shoulder pain from a lifting injury on 10/13/2023. Discussed the patient physical exam, radiographs, impression, and plan. Physical exam cervical spine is unremarkable for midline or paraspinal tenderness. He has intact range of motion cervical spine. Axial load and Spurling's are unremarkable. Left shoulder noted for tenderness at the anterior aspect. He has range of motion forward flexion to 150 degrees, abduction 90 degrees, and internal rotation to the lumbar spine. He has 4+/5 strength external rotation and supinators. There is positive Spicer and positive [place]'s, and positive apprehension. He has normal bicep reflex and radial pulse both upper extremities. Clinical impression is that he may have sustained soft tissue injury to the shoulder. At this time I will refer him for MRI of the left shoulder to evaluate for rotator cuff, biceps, and labral tear as invasive management may be warranted. He will return with having MRI done. Subjective:   Patient ID: Trang Paul is a 58 y.o. male. Chief Complaint   Patient presents with    Left Shoulder - Pain        50-year-old male [address]   [address] presents for evaluation of onset left shoulder pain from lifting injury on 10/13/2023. He was lifting a 40 pound bag of pellets when he felt a pop in the left shoulder.   He has pain described as sudden in onset, localized to the anterior aspect, burning and sharp, severe in intensity, worse with moving the arm, associated with limited range of motion, and improved with resting. He denies noticing any bruising. He presented to the emergency room where x-ray evaluation was unremarkable. He was advised to follow-up with orthopedic care. He has been icing, resting, and also doing range of motion exercises at home. He has been refraining from heavy lifting, but has been using resistance bands. He has history of MS and states the numbness in left upper extremity has not changed. Shoulder Pain  This is a new problem. The current episode started 1 to 4 weeks ago. The problem occurs daily. The problem has been waxing and waning. Associated symptoms include arthralgias and numbness. Pertinent negatives include no weakness. Exacerbated by: Arm elevation. He has tried rest, position changes and ice (Home exercise) for the symptoms. The treatment provided mild relief. The following portions of the patient's history were reviewed and updated as appropriate: He  has a past medical history of High blood pressure and MS (multiple sclerosis) ([address]). He is allergic to mometasone. .    Review of Systems   Musculoskeletal:  Positive for arthralgias. Neurological:  Positive for numbness. Negative for weakness. Objective:  Vitals:    11/01/23 1320   BP: 127/81   Pulse: 93   Weight: 128 kg (283 lb)   Height: 5' 8" (1.727 m)      Back Exam     Comments:    Cervical spine  - No tenderness  - Normal range of motion  - Negative axial load  - Negative Spurling's        Right Hand Exam     Muscle Strength   The patient has normal right wrist strength. Other   Pulse: present      Left Hand Exam     Muscle Strength   The patient has normal left wrist strength. Other   Pulse: present      Left Elbow Exam     Tenderness   The patient is experiencing no tenderness. Range of Motion   The patient has normal left elbow ROM. Muscle Strength   The patient has normal left elbow strength (5/5 flexion and extension).       Left Shoulder Exam     Tenderness   Left shoulder tenderness location: Anterior. Range of Motion   Active abduction:  90   Forward flexion:  150   Internal rotation 0 degrees:  Lumbar     Muscle Strength   Abduction: 5/5   Internal rotation: 5/5     Tests   Apprehension: positive  Spicer test: positive     Comments:  4+/5 external rotation and supraspinatus  Positive [place]'s          Strength/Myotome Testing     Left Wrist/Hand   Normal wrist strength    Right Wrist/Hand   Normal wrist strength      Physical Exam  Vitals and nursing note reviewed. Constitutional:       Appearance: Normal appearance. He is well-developed. He is not ill-appearing or diaphoretic. HENT:      Head: Normocephalic and atraumatic. Right Ear: External ear normal.      Left Ear: External ear normal.   Eyes:      Conjunctiva/sclera: Conjunctivae normal.   Neck:      Trachea: No tracheal deviation. Cardiovascular:      Rate and Rhythm: Normal rate. Pulmonary:      Effort: Pulmonary effort is normal. No respiratory distress. Abdominal:      General: There is no distension. Musculoskeletal:         General: Tenderness present. No swelling, deformity or signs of injury. Skin:     General: Skin is warm and dry. Coloration: Skin is not jaundiced or pale. Neurological:      Mental Status: He is alert and oriented to person, place, and time. Psychiatric:         Mood and Affect: Mood normal.         Behavior: Behavior normal.         Thought Content: Thought content normal.         Judgment: Judgment normal.         I have personally reviewed pertinent films in PACS and my interpretation is  .   No acute osseous abnormality left shoulder

## 2023-11-03 ENCOUNTER — OFFICE VISIT (OUTPATIENT)
Dept: PHYSICAL THERAPY | Facility: CLINIC | Age: 63
End: 2023-11-03
Payer: MEDICARE

## 2023-11-03 DIAGNOSIS — M25.561 ACUTE PAIN OF RIGHT KNEE: Primary | ICD-10-CM

## 2023-11-03 DIAGNOSIS — S80.01XD CONTUSION OF RIGHT KNEE, SUBSEQUENT ENCOUNTER: ICD-10-CM

## 2023-11-03 PROCEDURE — 97530 THERAPEUTIC ACTIVITIES: CPT

## 2023-11-03 PROCEDURE — 97112 NEUROMUSCULAR REEDUCATION: CPT

## 2023-11-03 PROCEDURE — 97110 THERAPEUTIC EXERCISES: CPT

## 2023-11-03 NOTE — PROGRESS NOTES
Daily Note     Today's date: 11/3/2023  Patient name: Margaret Wise  : 1960  MRN: [id number]  Referring provider: Sherry Rubio MD  Dx:   Encounter Diagnosis     ICD-10-CM    1. Acute pain of right knee  M25.561       2. Contusion of right knee, subsequent encounter  S80. 01XD                      Subjective: Patient reports that he was kneeling over the weekend to work on his pellet stove and his knee wasn't really bothering him. Objective: See treatment diary below      Assessment: Tolerated treatment well. Progressed hip 3 ways with addition of TB to facilitate improved hip strength. He reported increased challenge and muscular fatigue with progression. Provided with TB for completion at home. Demonstrates improving stability with less reliance on UEs to complete balance exercises. Patient demonstrated fatigue post treatment, exhibited good technique with therapeutic exercises, and would benefit from continued PT      Plan: Progress treatment as tolerated.        Precautions: MS- Balance Deficits, HTN  Access Code: [email or web address]    POC expires Unit limit Auth  expiration date PT/OT + Visit Limit?   23 N/A  BOMN                 Visit/Unit Tracking  AUTH Status:  Date 10/11 10/17 10/19 10/30 11/3          BOMN Used 1 2 3 4 5           Remaining                     Manuals 10/11 10/17 10/19 10/30 11/3                                                            Neuro Re-Ed             Semi-Tandem stance  2x30"  Min A 2x30"  Min A  2x30" min A        Zia step overs fwd and lat    X3 laps U/L UE on HR, CGA X3 laps U/L UE on HR, CGA        Rockerboard taps fwd/bwd, left/right  B/L UE   CGA   X20 ea dir B/L UE   CGA   X20 ea dir B/L UE   CGA   X20 ea dir B/L UE   CS   X20 ea dir        FT EO on foam and off foam     2x30" ea min A                                  Ther Ex             Rec bike vs upright bike for knee ROM  Rec L1 10' Rec L1 10' Rec L1 10' Rec L1 10'        Supine SLR HEP 2x10 2x10           Sidelying SLR HEP 2x10 2x10          Standing hip 3 ways with TB HEP no TB X10 B/L UE support  2x10 B/L UE support   GTB 2x10 ea dir B/L  1 UE support        Lateral walks with TB  GTB x3 laps B/L UE support GTB x3 laps B/L UE support GTB x3 laps B/L UE support GTB x4 laps B/L UE support        Monster walks with TB    GTB x3 laps U/L UE support  GTB x4 laps B/L UE support        Matrix knee ext  30# 2x10 30# 2x10 40# 3x10 40# 3x10        Matrix hamstring curls   40# 2x10 40# 2x10 55# 3x10 55# 3x10        Pt education PT POC, HEP, brace weaning            Ther Activity             Sit to stands HEP            Fwd step ups    6" x 10 6" x20  6"x30        Lat step ups     6"x20  6" x30         Gait Training                                       Modalities

## 2023-11-03 NOTE — PROGRESS NOTES
Daily Note     Today's date: 11/3/2023  Patient name: China Rivera  : 1960  MRN: [id number]  Referring provider: Selene Cameron MD  Dx:   Encounter Diagnosis     ICD-10-CM    1. Acute pain of right knee  M25.561       2. Contusion of right knee, subsequent encounter  S80. 01XD                      Subjective: Patient reports that he was exhausted after his last session with the progressions that were made to his program. He denied muscle soreness and states that he experienced more fatigue. He reports improved control over his balance when walking both in his home and outside his home. Objective: See treatment diary below      Assessment: Tolerated treatment well. Progressed step height this session to facilitate improved lower extremity strength functionally. Patient noted increased difficulty and fatigue with progression. Patient demonstrated fatigue post treatment, exhibited good technique with therapeutic exercises, and would benefit from continued PT      Plan: Progress treatment as tolerated.        Precautions: MS- Balance Deficits, HTN  Access Code: [email or web address]    POC expires Unit limit Auth  expiration date PT/OT + Visit Limit?   23 N/A  BOMN                 Visit/Unit Tracking  AUTH Status:  Date 10/11 10/17 10/19 10/30 11/3 11/6         BOMN Used 1 2 3 4 5 6          Remaining                     Manuals 10/11 10/17 10/19 10/30 11/3 11/6                                                           Neuro Re-Ed             Semi-Tandem stance  2x30"  Min A 2x30"  Min A  2x30" min A        Zia step overs fwd and lat    X3 laps U/L UE on HR, CGA X3 laps U/L UE on HR, CGA 3 laps U/L UE on HR, CGA       Rockerboard taps fwd/bwd, left/right  B/L UE   CGA   X20 ea dir B/L UE   CGA   X20 ea dir B/L UE   CGA   X20 ea dir B/L UE   CS   X20 ea dir B/L UE   CS   X30 ea dir       FT EO on foam and off foam     2x30" ea min A 2x30" ea min A                                 Ther Ex Rec bike vs upright bike for knee ROM  Rec L1 10' Rec L1 10' Rec L1 10' Rec L1 10' Rec L1 10'       Supine SLR HEP 2x10  2x10           Sidelying SLR HEP 2x10 2x10          Standing hip 3 ways with TB HEP no TB X10 B/L UE support  2x10 B/L UE support   GTB 2x10 ea dir B/L  1 UE support        Lateral walks with TB  GTB x3 laps B/L UE support GTB x3 laps B/L UE support GTB x3 laps B/L UE support GTB x4 laps B/L UE support GTB x4 laps B/L UE support       Monster walks with TB    GTB x3 laps U/L UE support  GTB x4 laps B/L UE support GTB x4 laps B/L UE support       Matrix knee ext  30# 2x10 30# 2x10 40# 3x10 40# 3x10 40# 3x10       Matrix hamstring curls   40# 2x10 40# 2x10 55# 3x10 55# 3x10 55# 3x10       Pt education PT POC, HEP, brace weaning            Ther Activity             Sit to stands HEP     x10  No UE       Fwd step ups    6" x 10 6" x20  6"x30 8"x30       Lat step ups     6"x20  6" x30  8"x30        Gait Training                                       Modalities

## 2023-11-06 ENCOUNTER — OFFICE VISIT (OUTPATIENT)
Dept: PHYSICAL THERAPY | Facility: CLINIC | Age: 63
End: 2023-11-06
Payer: MEDICARE

## 2023-11-06 DIAGNOSIS — M25.561 ACUTE PAIN OF RIGHT KNEE: Primary | ICD-10-CM

## 2023-11-06 DIAGNOSIS — S80.01XD CONTUSION OF RIGHT KNEE, SUBSEQUENT ENCOUNTER: ICD-10-CM

## 2023-11-06 PROCEDURE — 97112 NEUROMUSCULAR REEDUCATION: CPT

## 2023-11-06 PROCEDURE — 97530 THERAPEUTIC ACTIVITIES: CPT

## 2023-11-06 PROCEDURE — 97110 THERAPEUTIC EXERCISES: CPT

## 2023-11-09 ENCOUNTER — EVALUATION (OUTPATIENT)
Dept: PHYSICAL THERAPY | Facility: CLINIC | Age: 63
End: 2023-11-09
Payer: MEDICARE

## 2023-11-09 DIAGNOSIS — S80.01XD CONTUSION OF RIGHT KNEE, SUBSEQUENT ENCOUNTER: ICD-10-CM

## 2023-11-09 DIAGNOSIS — M25.561 ACUTE PAIN OF RIGHT KNEE: Primary | ICD-10-CM

## 2023-11-09 PROCEDURE — 97140 MANUAL THERAPY 1/> REGIONS: CPT

## 2023-11-09 PROCEDURE — 97112 NEUROMUSCULAR REEDUCATION: CPT

## 2023-11-09 PROCEDURE — 97110 THERAPEUTIC EXERCISES: CPT

## 2023-11-09 NOTE — PROGRESS NOTES
PT Discharge    Today's date: 2023  Patient name: China Rivera  : 1960  MRN: [id number]  Referring provider: Selene Cameron MD  Dx:   Encounter Diagnosis     ICD-10-CM    1. Acute pain of right knee  M25.561       2. Contusion of right knee, subsequent encounter  S80. 01XD                      Assessment  Assessment details: China Rivear is a 58 y.o. male presenting to physical therapy for a reevaluation with a MD referral of acute pain of right knee. Since his initial evaluation, he reports 100% improvement in his knee. The patient has demonstrated improved right knee strength to [facility], abolished pain, improved gait without antalgia, as well as subjective reports of improved functional mobility to [facility]. He has met his goals, is independent with completing his HEP, and is to be DC from PT services at this time to a comprehensive HEP. He was provided with an updated HEP and verbalized understanding it's completion. Understanding of Dx/Px/POC: excellent   Prognosis: good    Goals  STG to be achieved in 4 weeks: The patient will report no pain with usual activities. MET  The patient will increase right hip abduction MMT score to at least 4/5 to allow for improved functional mobility. MET    LTG to be achieved by DC: The patient will be independent in comprehensive HEP. MET  The patient will increase right knee extension and flexion MMT score to [facility] to allow for improved functional mobility. MET  The patient will be able to return to his 2-3 mile walks without pain or difficulty due to his knee. MET  The patient will report improved balance and stability to his baseline before his fall. MET    Plan  Plan of Care beginning date: 2023  Plan of Care expiration date: 2023  Treatment plan discussed with: patient        Subjective Evaluation    History of Present Illness  Mechanism of injury:  Ingrid Olson reports 100% improvement since beginning PT services.  He reports that his pain is abolished at this time. He says that he has also seen a large improvement in his leg strength as well. He says that he has always struggled with his balance and stability since his diagnosis of MS and feels that through the balance and stability exercises we have done in therapy for his knee, have also helped his overall balance as well. He has purchased several pieces of equipment including rockerboard and foam pad to continue to build upon his progress that he made in PT. He is back to his 2 mile walks again and denies any issues completing these. Patient Goals  Patient goals for therapy: increased strength  Patient goal: to get my knee back to where it was before the fall  Pain  No pain reported  Relieving factors: ice, change in position, medications and rest  Progression: resolved    Social Support  Steps to enter house: yes  Stairs in house: yes   Lives in: multiple-level home  Lives with: spouse    Employment status: not working    Diagnostic Tests  X-ray: normal  Treatments  Previous treatment: medication  Current treatment: physical therapy        Objective     Palpation     Right   No palpable tenderness to the distal biceps femoris, distal semimembranosus, distal semitendinosus, lateral gastrocnemius and medial gastrocnemius. Tenderness     Right Knee   No tenderness in the inferior patella, lateral joint line, lateral patella, LCL (distal), LCL (proximal), MCL (distal), MCL (proximal), medial joint line, medial patella, patellar tendon, pes anserinus, popliteal fossa, quadriceps tendon and superior patella.      Active Range of Motion   Left Knee   Flexion: 120 degrees   Extension: 0 degrees     Right Knee   Flexion: 120 degrees   Extension: 0 degrees     Mobility   Patellar Mobility:   Left Knee   Hypermobile: left medial, left lateral, left superior and left inferior      Right Knee   Hypermobile: medial, lateral, superior and inferior     Strength/Myotome Testing     Left Hip   Planes of Motion   Flexion: 5  Abduction: 5    Right Hip   Planes of Motion   Flexion: 5  Abduction: 5    Left Knee   Flexion: 5  Extension: 5    Right Knee   Flexion: 5  Extension: 5    Tests     Right Knee   Negative anterior drawer, lateral Ricardo, medial Ricardo, patellar compression, valgus stress test at 0 degrees and varus stress test at 0 degrees.      Ambulation   Weight-Bearing Status   Weight-Bearing Status (Left): full weight bearing   Assistive device used: single point cane    Ambulation: Level Surfaces   Ambulation with assistive device: independent             Precautions: MS- Balance Deficits, HTN  Access Code: [email or web address]    POC expires Unit limit Auth  expiration date PT/OT + Visit Limit?   12/6/23 N/A 12/31 BOMN                 Visit/Unit Tracking  AUTH Status:  Date 10/11 10/17 10/19 10/30 11/3 11/6 11/9        BOMN Used 1 2 3 4 5 6 7         Remaining                     Manuals 10/11 10/17 10/19 10/30 11/3 11/6 11/9      Reassessment       BE                                             Neuro Re-Ed             Semi-Tandem stance  2x30"  Min A 2x30"  Min A  2x30" min A        Zia step overs fwd and lat    X3 laps U/L UE on HR, CGA X3 laps U/L UE on HR, CGA 3 laps U/L UE on HR, CGA 3 laps ea dir no HR,   CS      Rockerboard taps fwd/bwd, left/right  B/L UE   CGA   X20 ea dir B/L UE   CGA   X20 ea dir B/L UE   CGA   X20 ea dir B/L UE   CS   X20 ea dir B/L UE   CS   X30 ea dir Finger touch CS x30 ea dir       FT EO on foam and off foam     2x30" ea min A 2x30" ea min A                                 Ther Ex             Rec bike vs upright bike for knee ROM  Rec L1 10' Rec L1 10' Rec L1 10' Rec L1 10' Rec L1 10' Rec L1 10'      Supine SLR HEP 2x10  2x10           Sidelying SLR HEP 2x10 2x10          Standing hip 3 ways with TB HEP no TB X10 B/L UE support  2x10 B/L UE support   GTB 2x10 ea dir B/L  1 UE support        Lateral walks with TB  GTB x3 laps B/L UE support GTB x3 laps B/L UE support GTB x3 em B/L UE support GTB x4 laps B/L UE support GTB x4 laps B/L UE support       Monster walks with TB    GTB x3 laps U/L UE support  GTB x4 laps B/L UE support GTB x4 laps B/L UE support       Matrix knee ext  30# 2x10 30# 2x10 40# 3x10 40# 3x10 40# 3x10 40# 3x10      Matrix hamstring curls   40# 2x10 40# 2x10 55# 3x10 55# 3x10 55# 3x10 55# 3x10      Pt education PT POC, HEP, brace weaning      HEP review       Ther Activity             Sit to stands HEP     x10  No UE       Fwd step ups    6" x 10 6" x20  6"x30 8"x30       Lat step ups     6"x20  6" x30  8"x30        Gait Training                                       Modalities

## 2023-11-12 ENCOUNTER — HOSPITAL ENCOUNTER (OUTPATIENT)
Dept: MRI IMAGING | Facility: HOSPITAL | Age: 63
Discharge: HOME | End: 2023-11-12
Attending: FAMILY MEDICINE
Payer: MEDICARE

## 2023-11-12 ENCOUNTER — HOSPITAL ENCOUNTER (OUTPATIENT)
Dept: RADIOLOGY | Facility: HOSPITAL | Age: 63
Discharge: HOME | End: 2023-11-12
Payer: MEDICARE

## 2023-11-12 DIAGNOSIS — S46.002A ROTATOR CUFF INJURY, LEFT, INITIAL ENCOUNTER: ICD-10-CM

## 2023-11-12 PROCEDURE — G1004 CDSM NDSC: HCPCS

## 2023-11-12 PROCEDURE — 73221 MRI JOINT UPR EXTREM W/O DYE: CPT

## 2023-11-18 ENCOUNTER — OFFICE VISIT (OUTPATIENT)
Dept: OBGYN CLINIC | Facility: CLINIC | Age: 63
End: 2023-11-18
Payer: MEDICARE

## 2023-11-18 VITALS
BODY MASS INDEX: 42.62 KG/M2 | DIASTOLIC BLOOD PRESSURE: 95 MMHG | SYSTOLIC BLOOD PRESSURE: 145 MMHG | HEART RATE: 78 BPM | WEIGHT: 281.2 LBS | HEIGHT: 68 IN

## 2023-11-18 DIAGNOSIS — M75.102 TEAR OF LEFT SUPRASPINATUS TENDON: Primary | ICD-10-CM

## 2023-11-18 DIAGNOSIS — M75.42 SUBACROMIAL IMPINGEMENT OF LEFT SHOULDER: ICD-10-CM

## 2023-11-18 DIAGNOSIS — S46.212D BICEPS STRAIN, LEFT, SUBSEQUENT ENCOUNTER: ICD-10-CM

## 2023-11-18 DIAGNOSIS — S46.812D PARTIAL TEAR OF LEFT SUBSCAPULARIS TENDON, SUBSEQUENT ENCOUNTER: ICD-10-CM

## 2023-11-18 PROCEDURE — 99213 OFFICE O/P EST LOW 20 MIN: CPT | Performed by: FAMILY MEDICINE

## 2023-11-18 PROCEDURE — 20610 DRAIN/INJ JOINT/BURSA W/O US: CPT | Performed by: FAMILY MEDICINE

## 2023-11-18 RX ORDER — BUPIVACAINE HYDROCHLORIDE 2.5 MG/ML
4 INJECTION, SOLUTION INFILTRATION; PERINEURAL
Status: COMPLETED | OUTPATIENT
Start: 2023-11-18 | End: 2023-11-18

## 2023-11-18 RX ORDER — BUPIVACAINE HYDROCHLORIDE 2.5 MG/ML
1 INJECTION, SOLUTION INFILTRATION; PERINEURAL
Status: COMPLETED | OUTPATIENT
Start: 2023-11-18 | End: 2023-11-18

## 2023-11-18 RX ORDER — TRIAMCINOLONE ACETONIDE 40 MG/ML
40 INJECTION, SUSPENSION INTRA-ARTICULAR; INTRAMUSCULAR
Status: COMPLETED | OUTPATIENT
Start: 2023-11-18 | End: 2023-11-18

## 2023-11-18 RX ADMIN — BUPIVACAINE HYDROCHLORIDE 1 ML: 2.5 INJECTION, SOLUTION INFILTRATION; PERINEURAL at 08:15

## 2023-11-18 RX ADMIN — BUPIVACAINE HYDROCHLORIDE 4 ML: 2.5 INJECTION, SOLUTION INFILTRATION; PERINEURAL at 08:15

## 2023-11-18 RX ADMIN — TRIAMCINOLONE ACETONIDE 40 MG: 40 INJECTION, SUSPENSION INTRA-ARTICULAR; INTRAMUSCULAR at 08:15

## 2023-11-18 NOTE — PROGRESS NOTES
Assessment/Plan:  Assessment/Plan   Diagnoses and all orders for this visit:    Tear of left supraspinatus tendon  -     Ambulatory Referral to Physical Therapy; Future  -     Large joint arthrocentesis: L subacromial bursa    Partial tear of left subscapularis tendon, subsequent encounter  -     Ambulatory Referral to Physical Therapy; Future    Biceps strain, left, subsequent encounter  -     Ambulatory Referral to Physical Therapy; Future    Subacromial impingement of left shoulder  -     Ambulatory Referral to Physical Therapy; Future          21-year-old right-hand-dominant male [address] with onset, of left shoulder pain from lifting injury on 10/13/2023. Discussed with patient MRI results, impression, and plan. MRI left shoulder noted for interstitial insertional tear superior bundle of the subscapularis tendon, medial dislocation long head biceps tendon, mild fatty atrophy superior subscapularis muscle, partial thickness partial width articular surface insertional tear posterior supraspinatus tendon without muscle atrophy, mild glenohumeral's arthritis with degenerative type tearing lateral labrum. He has range of motion forward flexion to 160 degrees, abduction 160 degrees, and internal rotation to the lumbar spine. Clinical impression is that he has combination of acute and chronic rotator cuff pathology. I advised patient that his range of motion is improved so we will do trial of conservative management prior to exploring surgical intervention to which he agrees. I administered mixture of 3 cc 0.25% bupivacaine and 1 cc Kenalog to the left shoulder subacromial space without complication. He is to start physical therapy as soon as possible and do home exercises as directed. He will follow-up if no improvement after more than 4 weeks of formal therapy. Subjective:   Patient ID: Trang Paul is a 58 y.o. male.   Chief Complaint   Patient presents with    Left Shoulder - Follow-up        79-year-old right-hand-dominant male [address] following up for onset of left shoulder pain from lifting injury on 10/13/2023. He was last seen by me 2.5 weeks ago at which point he was referred for MRI left shoulder. He has been experiencing pain described as generalized to the shoulder worse at the anterior lateral aspects, nonradiating, worse with twisting the arm, associated with limited range of motion, bothersome at nighttime, and improved with resting or changing position. He states that most the time when resting position he has achy pain that is 2 out of 10, but if moving a certain way pain can be greater than 7 out of 10. Shoulder Pain  The current episode started more than 1 month ago. The problem occurs daily. The problem has been gradually improving. Associated symptoms include arthralgias and weakness. Pertinent negatives include no joint swelling or numbness. Exacerbated by: Arm movement. He has tried rest, position changes and ice for the symptoms. The treatment provided mild relief. Review of Systems   Musculoskeletal:  Positive for arthralgias. Negative for joint swelling. Neurological:  Positive for weakness. Negative for numbness. Objective:  Vitals:    11/18/23 0805   BP: 145/95   Pulse: 78   Weight: 128 kg (281 lb 3.2 oz)   Height: 5' 8" (1.727 m)      Left Shoulder Exam     Range of Motion   Active abduction:  160   Forward flexion:  160   Internal rotation 0 degrees:  Lumbar              Physical Exam  Vitals and nursing note reviewed. Constitutional:       Appearance: Normal appearance. He is well-developed. He is not ill-appearing or diaphoretic. HENT:      Head: Normocephalic and atraumatic. Right Ear: External ear normal.      Left Ear: External ear normal.   Eyes:      Conjunctiva/sclera: Conjunctivae normal.   Neck:      Trachea: No tracheal deviation. Cardiovascular:      Rate and Rhythm: Normal rate.    Pulmonary: Effort: Pulmonary effort is normal. No respiratory distress. Abdominal:      General: There is no distension. Musculoskeletal:         General: No swelling, deformity or signs of injury. Skin:     General: Skin is warm and dry. Coloration: Skin is not jaundiced or pale. Neurological:      Mental Status: He is alert and oriented to person, place, and time. Psychiatric:         Mood and Affect: Mood normal.         Behavior: Behavior normal.         Thought Content: Thought content normal.         Judgment: Judgment normal.         I have personally reviewed pertinent films in PACS and my interpretation is  . Partial-thickness supraspinatus tear without tendon retraction    Large joint arthrocentesis: L subacromial bursa  Universal Protocol:  Consent: Verbal consent obtained. Risks and benefits: risks, benefits and alternatives were discussed  Consent given by: patient  Time out: Immediately prior to procedure a "time out" was called to verify the correct patient, procedure, equipment, support staff and site/side marked as required. Patient understanding: patient states understanding of the procedure being performed  Patient consent: the patient's understanding of the procedure matches consent given  Procedure consent: procedure consent matches procedure scheduled  Relevant documents: relevant documents present and verified  Test results: test results available and properly labeled  Site marked: the operative site was marked  Radiology Images displayed and confirmed.  If images not available, report reviewed: imaging studies available  Required items: required blood products, implants, devices, and special equipment available  Patient identity confirmed: verbally with patient  Supporting Documentation  Indications: pain   Procedure Details  Location: shoulder - L subacromial bursa  Preparation: Patient was prepped and draped in the usual sterile fashion  Needle gauge: 21G 2"  Approach: lateral  Medications administered: 4 mL bupivacaine 0.25 %; 1 mL bupivacaine 0.25 %; 40 mg triamcinolone acetonide 40 mg/mL    Patient tolerance: patient tolerated the procedure well with no immediate complications  Dressing:  Sterile dressing applied

## 2023-12-17 ENCOUNTER — OFFICE VISIT (OUTPATIENT)
Dept: URGENT CARE | Facility: CLINIC | Age: 63
End: 2023-12-17
Payer: MEDICARE

## 2023-12-17 VITALS
OXYGEN SATURATION: 96 % | TEMPERATURE: 98.2 F | RESPIRATION RATE: 18 BRPM | SYSTOLIC BLOOD PRESSURE: 137 MMHG | DIASTOLIC BLOOD PRESSURE: 87 MMHG | HEART RATE: 104 BPM

## 2023-12-17 DIAGNOSIS — J20.9 ACUTE BRONCHITIS, UNSPECIFIED ORGANISM: Primary | ICD-10-CM

## 2023-12-17 PROCEDURE — G0463 HOSPITAL OUTPT CLINIC VISIT: HCPCS

## 2023-12-17 PROCEDURE — 99213 OFFICE O/P EST LOW 20 MIN: CPT

## 2023-12-17 RX ORDER — PREDNISONE 20 MG/1
40 TABLET ORAL DAILY
Qty: 10 TABLET | Refills: 0 | Status: SHIPPED | OUTPATIENT
Start: 2023-12-17 | End: 2023-12-22

## 2023-12-17 RX ORDER — AZITHROMYCIN 250 MG/1
TABLET, FILM COATED ORAL
Qty: 6 TABLET | Refills: 0 | Status: SHIPPED | OUTPATIENT
Start: 2023-12-17 | End: 2023-12-22

## 2023-12-17 NOTE — PROGRESS NOTES
[facility]        NAME: Jesús Sainz is a 63 y.o. male  : 1960    MRN: [id number]  DATE: 2023  TIME: 1:43 PM    Assessment and Plan   Acute bronchitis, unspecified organism [J20.9]  1. Acute bronchitis, unspecified organism  predniSONE 20 mg tablet    azithromycin (ZITHROMAX) 250 mg tablet        Symptoms consistent with bronchitis. Given wheezing symptoms without history will start on steroids. Pt with over 8 days of symptoms and worsening so will cover with antibiotics -- no just dry coughing symptoms. IF worsening return and consider chest xray.    Given education on supportive measures for symptoms in discharge instructions.  Follow up with primary care in 3-5 days.  Go to ER if symptoms get worse.     Patient Instructions     --Take all of the antibiotics and steroids for symptoms.     --Rest, drink plenty of fluids     --For nasal/sinus congestion, you can try steam, warm compresses, saline nasal spray, Neti pot, nasal steroid (Flonase, Nasocort) to be used at bedtime after the saline nasal spray     --For cough, you can take an OTC expectorant such as plain Robitussion or Mucinex. A spoonful of honey at bedtime may also be helpful.    --You should contact your primary care provider and/or go to the ER if your symptoms are not improved or get worse over the next 7 days. This includes new onset fever, localized ear pain, sinus pain, as well as worsening cough, chest pain, shortness of breath, or significant weakness/fatigue.      Chief Complaint     Chief Complaint   Patient presents with    Cough     Productive Cough and congestion x8 days. Reports wheezing when coughing. Yellow drainage from nose         History of Present Illness       Presents with sick symptoms including productive cough, and congestion for 8 days. Noted wheezing with coughing episodes, no history of asthma/COPD. Nasal congestion with yellow drainage. Trying OTC metamucil and robitussin without relief.          Review of Systems   Review of Systems   Constitutional:  Negative for chills and fever.   HENT:  Positive for congestion. Negative for ear pain and sore throat.    Respiratory:  Positive for cough, shortness of breath and wheezing.    Cardiovascular:  Negative for chest pain and palpitations.   Gastrointestinal:  Negative for diarrhea, nausea and vomiting.   Musculoskeletal:  Negative for myalgias.   Skin:  Negative for color change and rash.   Psychiatric/Behavioral:  Negative for confusion.    All other systems reviewed and are negative.        Current Medications       Current Outpatient Medications:     aspirin 81 mg chewable tablet, Chew 81 mg, Disp: , Rfl:     atorvastatin (LIPITOR) 40 mg tablet, , Disp: , Rfl:     azithromycin (ZITHROMAX) 250 mg tablet, Take 2 tablets today then 1 tablet daily x 4 days, Disp: 6 tablet, Rfl: 0    chlorhexidine (PERIDEX) 0.12 % solution, USE TWICE A DAY AS DIRECTED (DONT SWALLOW), Disp: , Rfl:     Cholecalciferol 125 MCG/ML LIQD, Take 5,000 Units by mouth, Disp: , Rfl:     Ciclopirox 0.77 % gel, , Disp: , Rfl:     EPINEPHrine (EPIPEN) 0.3 mg/0.3 mL SOAJ, , Disp: , Rfl:     Glatiramer Acetate 40 MG/ML SOSY, INJECT ONE SYRINGE SUBCUTANEOUSLY 3 TIMES A WEEK AT LEAST 48 HOURS APART. ALLOW TO WARM TO ROOM TEMP FOR 20 MINUTES. REFRIGERATE., Disp: , Rfl:     ketoconazole (NIZORAL) 2 % shampoo, Apply topically, Disp: , Rfl:     losartan-hydrochlorothiazide (HYZAAR) 50-12.5 mg per tablet, , Disp: , Rfl:     predniSONE 20 mg tablet, Take 2 tablets (40 mg total) by mouth daily for 5 days, Disp: 10 tablet, Rfl: 0    tacrolimus (PROTOPIC) 0.1 % ointment, APPLY TOPICALLY TWICE DAILY FOR 14 DAYS, Disp: 100 g, Rfl: 0    ALPRAZolam (XANAX) 0.5 mg tablet, Take 0.5 mg by mouth Three times daily as needed (Patient not taking: Reported on 12/17/2023), Disp: , Rfl:     amoxicillin (AMOXIL) 500 mg capsule, , Disp: , Rfl:     Glatiramer Acetate 40 MG/ML SOSY, , Disp: , Rfl:     losartan  (COZAAR) 50 mg tablet, , Disp: , Rfl:     losartan (COZAAR) 50 mg tablet, Take 1 tablet by mouth daily, Disp: , Rfl:     naproxen (NAPROSYN) 500 mg tablet, Take 1 tablet (500 mg total) by mouth every 12 (twelve) hours as needed for mild pain or moderate pain (Patient not taking: Reported on 11/18/2023), Disp: 20 tablet, Rfl: 0    Current Allergies     Allergies as of 12/17/2023 - Reviewed 12/17/2023   Allergen Reaction Noted    Mometasone Other (See Comments) 12/04/2019            The following portions of the patient's history were reviewed and updated as appropriate: allergies, current medications, past family history, past medical history, past social history, past surgical history and problem list.     Past Medical History:   Diagnosis Date    High blood pressure     2021    MS (multiple sclerosis) (HCC)     2020       Past Surgical History:   Procedure Laterality Date    BUNIONECTOMY Right     2002    CYST REMOVAL      1990's    FL MYELOGRAM LUMBAR  2/4/2020    NOSTRIL RETAINER INSERTION Bilateral     1990's       Family History   Problem Relation Age of Onset    Cancer Father         clear cell carcinma    Prostate cancer Father     Hypertension Father     Nephrolithiasis Father     Diabetes Mother     Hypertension Mother     Cancer Paternal Uncle         clear cell carcinma    Cancer Brother         clear cell carcinma    Hypertension Sister     Diabetes Sister     Hyperlipidemia Sister     Hypertension Sister     Diabetes Sister     Hyperlipidemia Sister     No Known Problems Daughter          Medications have been verified.        Objective   /87   Pulse 104   Temp 98.2 °F (36.8 °C)   Resp 18   SpO2 96%        Physical Exam     Physical Exam  Vitals reviewed.   Constitutional:       General: He is not in acute distress.     Appearance: Normal appearance.   HENT:      Right Ear: Tympanic membrane, ear canal and external ear normal.      Left Ear: Tympanic membrane, ear canal and external ear  normal.      Nose: Nose normal.      Mouth/Throat:      Mouth: Mucous membranes are moist.      Pharynx: No posterior oropharyngeal erythema.   Eyes:      Conjunctiva/sclera: Conjunctivae normal.   Cardiovascular:      Rate and Rhythm: Normal rate and regular rhythm.      Pulses: Normal pulses.      Heart sounds: Normal heart sounds. No murmur heard.  Pulmonary:      Effort: Pulmonary effort is normal. No respiratory distress.      Breath sounds: Wheezing present.   Skin:     General: Skin is warm and dry.   Neurological:      General: No focal deficit present.      Mental Status: He is alert and oriented to person, place, and time.   Psychiatric:         Mood and Affect: Mood normal.         Behavior: Behavior normal.

## 2023-12-17 NOTE — PATIENT INSTRUCTIONS
--Take all of the antibiotics and steroids for symptoms.     --Rest, drink plenty of fluids     --For nasal/sinus congestion, you can try steam, warm compresses, saline nasal spray, Neti pot, nasal steroid (Flonase, Nasocort) to be used at bedtime after the saline nasal spray     --For cough, you can take an OTC expectorant such as plain Robitussion or Mucinex. A spoonful of honey at bedtime may also be helpful.    --You should contact your primary care provider and/or go to the ER if your symptoms are not improved or get worse over the next 7 days. This includes new onset fever, localized ear pain, sinus pain, as well as worsening cough, chest pain, shortness of breath, or significant weakness/fatigue.

## 2023-12-27 ENCOUNTER — OFFICE VISIT (OUTPATIENT)
Dept: FAMILY MEDICINE CLINIC | Facility: CLINIC | Age: 63
End: 2023-12-27
Payer: MEDICARE

## 2023-12-27 VITALS
TEMPERATURE: 96.3 F | BODY MASS INDEX: 42.95 KG/M2 | SYSTOLIC BLOOD PRESSURE: 130 MMHG | OXYGEN SATURATION: 96 % | WEIGHT: 283.4 LBS | DIASTOLIC BLOOD PRESSURE: 84 MMHG | HEART RATE: 112 BPM | HEIGHT: 68 IN

## 2023-12-27 DIAGNOSIS — B34.9 VIRAL SYNDROME: Primary | ICD-10-CM

## 2023-12-27 PROCEDURE — 99214 OFFICE O/P EST MOD 30 MIN: CPT | Performed by: FAMILY MEDICINE

## 2023-12-27 RX ORDER — DEXTROMETHORPHAN HYDROBROMIDE AND PROMETHAZINE HYDROCHLORIDE 15; 6.25 MG/5ML; MG/5ML
5 SYRUP ORAL 4 TIMES DAILY PRN
Qty: 240 ML | Refills: 0 | Status: SHIPPED | OUTPATIENT
Start: 2023-12-27

## 2023-12-27 NOTE — PROGRESS NOTES
Answers submitted by the patient for this visit:  Cough Questionnaire (Submitted on 12/26/2023)  Chief Complaint: Cough  Chronicity: chronic  Onset: 1 to 4 weeks ago  Progression since onset: waxing and waning  Frequency: every few hours  Cough characteristics: productive of sputum  chest pain: Yes  chills: No  ear congestion: No  ear pain: No  fever: No  headaches: No  heartburn: No  hemoptysis: No  myalgias: No  nasal congestion: Yes  postnasal drip: No  rash: No  rhinorrhea: Yes  shortness of breath: No  sore throat: No  sweats: No  weight loss: No  wheezing: Yes  Aggravated by: cold air, dust, stress    Depression Screening and Follow-up Plan: Patient was screened for depression during today's encounter. They screened negative with a PHQ-2 score of 0.      Assessment/Plan:     Chronic Problems:  No problem-specific Assessment & Plan notes found for this encounter.      Visit Diagnosis:  Diagnoses and all orders for this visit:    Viral syndrome  -     promethazine-dextromethorphan (PHENERGAN-DM) 6.25-15 mg/5 mL oral syrup; Take 5 mL by mouth 4 (four) times a day as needed for cough    Resolving  Continue symptomatic care  Cheratussin at nigth for cough, will cause sedation  Increase oral hydration, warm tea with honey, increase nutrition   Adequate rest  Tylenol or Motrin for pain and/or fever  Nasal mist  Humidify room  Use decongestant- Mucinex  Zinc lozenges   Good handwashing  Call for any changes  Subjective:    Patient ID: Jesús Sainz is a 63 y.o. male.    F/u urgent care 12/17   Dx with bronchitis ,   Cough persist , wheezing has stopped   Took additional Zithromax of spouse cough medicine much improvement  Denies wheezing shortness of breath difficulty breathing negative fevers  Past hx of Ms , neg asthma , non smoker         Cough  This is a chronic problem. The current episode started 1 to 4 weeks ago. The problem has been waxing and waning. The problem occurs every few hours. The cough is Productive  "of sputum. Associated symptoms include nasal congestion. Pertinent negatives include no chest pain, chills, ear congestion, ear pain, fever, headaches, heartburn, hemoptysis, myalgias, postnasal drip, rash, rhinorrhea, sore throat, shortness of breath, sweats, weight loss or wheezing. The symptoms are aggravated by cold air, dust and stress. His past medical history is significant for bronchitis. There is no history of asthma, COPD, emphysema, environmental allergies or pneumonia.       The following portions of the patient's history were reviewed and updated as appropriate: allergies, current medications, past family history, past medical history, past social history, past surgical history and problem list.    Review of Systems   Constitutional:  Negative for chills, fever and weight loss.   HENT:  Negative for ear pain, postnasal drip, rhinorrhea and sore throat.    Respiratory:  Positive for cough. Negative for hemoptysis, shortness of breath and wheezing.    Cardiovascular:  Negative for chest pain.   Gastrointestinal:  Negative for heartburn.   Musculoskeletal:  Negative for myalgias.   Skin:  Negative for rash.   Allergic/Immunologic: Negative for environmental allergies.   Neurological:  Negative for headaches.         /84 (BP Location: Left arm, Patient Position: Sitting, Cuff Size: Standard)   Pulse (!) 112   Temp (!) 96.3 °F (35.7 °C) (Tympanic)   Ht 5' 8\" (1.727 m)   Wt 129 kg (283 lb 6.4 oz)   SpO2 96%   BMI 43.09 kg/m²   Social History     Socioeconomic History    Marital status: /Civil Union     Spouse name: Not on file    Number of children: Not on file    Years of education: Not on file    Highest education level: Not on file   Occupational History    Not on file   Tobacco Use    Smoking status: Never     Passive exposure: Past    Smokeless tobacco: Never   Vaping Use    Vaping status: Never Used   Substance and Sexual Activity    Alcohol use: Never    Drug use: Never    Sexual " activity: Yes   Other Topics Concern    Not on file   Social History Narrative    Not on file     Social Determinants of Health     Financial Resource Strain: Not on file   Food Insecurity: Not on file   Transportation Needs: Not on file   Physical Activity: Not on file   Stress: Not on file   Social Connections: Not on file   Intimate Partner Violence: Not on file   Housing Stability: Not on file     Past Medical History:   Diagnosis Date    High blood pressure     2021    MS (multiple sclerosis) (HCC)     2020     Family History   Problem Relation Age of Onset    Cancer Father         clear cell carcinma    Prostate cancer Father     Hypertension Father     Nephrolithiasis Father     Diabetes Mother     Hypertension Mother     Cancer Paternal Uncle         clear cell carcinma    Cancer Brother         clear cell carcinma    Hypertension Sister     Diabetes Sister     Hyperlipidemia Sister     Hypertension Sister     Diabetes Sister     Hyperlipidemia Sister     No Known Problems Daughter      Past Surgical History:   Procedure Laterality Date    BUNIONECTOMY Right     2002    CYST REMOVAL      1990's    FL MYELOGRAM LUMBAR  2/4/2020    NOSTRIL RETAINER INSERTION Bilateral     1990's       Current Outpatient Medications:     aspirin 81 mg chewable tablet, Chew 81 mg, Disp: , Rfl:     atorvastatin (LIPITOR) 40 mg tablet, , Disp: , Rfl:     chlorhexidine (PERIDEX) 0.12 % solution, USE TWICE A DAY AS DIRECTED (DONT SWALLOW), Disp: , Rfl:     Cholecalciferol 125 MCG/ML LIQD, Take 5,000 Units by mouth, Disp: , Rfl:     Ciclopirox 0.77 % gel, , Disp: , Rfl:     EPINEPHrine (EPIPEN) 0.3 mg/0.3 mL SOAJ, , Disp: , Rfl:     Glatiramer Acetate 40 MG/ML SOSY, INJECT ONE SYRINGE SUBCUTANEOUSLY 3 TIMES A WEEK AT LEAST 48 HOURS APART. ALLOW TO WARM TO ROOM TEMP FOR 20 MINUTES. REFRIGERATE., Disp: , Rfl:     ketoconazole (NIZORAL) 2 % shampoo, Apply topically, Disp: , Rfl:     losartan-hydrochlorothiazide (HYZAAR) 50-12.5 mg per  tablet, , Disp: , Rfl:     promethazine-dextromethorphan (PHENERGAN-DM) 6.25-15 mg/5 mL oral syrup, Take 5 mL by mouth 4 (four) times a day as needed for cough, Disp: 240 mL, Rfl: 0    tacrolimus (PROTOPIC) 0.1 % ointment, APPLY TOPICALLY TWICE DAILY FOR 14 DAYS, Disp: 100 g, Rfl: 0    ALPRAZolam (XANAX) 0.5 mg tablet, Take 0.5 mg by mouth Three times daily as needed (Patient not taking: Reported on 12/17/2023), Disp: , Rfl:     amoxicillin (AMOXIL) 500 mg capsule, , Disp: , Rfl:     Glatiramer Acetate 40 MG/ML SOSY, , Disp: , Rfl:     losartan (COZAAR) 50 mg tablet, , Disp: , Rfl:     losartan (COZAAR) 50 mg tablet, Take 1 tablet by mouth daily, Disp: , Rfl:     naproxen (NAPROSYN) 500 mg tablet, Take 1 tablet (500 mg total) by mouth every 12 (twelve) hours as needed for mild pain or moderate pain (Patient not taking: Reported on 11/18/2023), Disp: 20 tablet, Rfl: 0    Allergies   Allergen Reactions    Mometasone Other (See Comments)     Throat Irritation          Lab Review   No visits with results within 2 Month(s) from this visit.   Latest known visit with results is:   Appointment on 05/02/2023   Component Date Value    Sodium 05/02/2023 139     Potassium 05/02/2023 4.2     Chloride 05/02/2023 102     CO2 05/02/2023 30     ANION GAP 05/02/2023 7     BUN 05/02/2023 21     Creatinine 05/02/2023 0.96     Glucose, Fasting 05/02/2023 140 (H)     Calcium 05/02/2023 9.7     eGFR 05/02/2023 83         Imaging: No results found.    Objective:     Physical Exam  Constitutional:       General: He is not in acute distress.     Appearance: He is obese. He is not ill-appearing, toxic-appearing or diaphoretic.   HENT:      Head: Normocephalic and atraumatic.      Right Ear: Tympanic membrane normal.      Left Ear: Tympanic membrane normal.      Nose: Nose normal.   Eyes:      Conjunctiva/sclera: Conjunctivae normal.   Cardiovascular:      Rate and Rhythm: Normal rate.      Pulses: Normal pulses.   Pulmonary:      Effort:  "Pulmonary effort is normal. No respiratory distress.      Breath sounds: No wheezing or rales.   Musculoskeletal:         General: Normal range of motion.      Cervical back: Normal range of motion.   Skin:     General: Skin is warm and dry.   Psychiatric:         Mood and Affect: Mood normal.         Behavior: Behavior normal.         Thought Content: Thought content normal.           There are no Patient Instructions on file for this visit.    OSKAR Swan    Portions of the record may have been created with voice recognition software.  Occasional wrong word or \"sound a like\" substitutions may have occurred due to the inherent limitations of voice recognition software.  Read the chart carefully and recognize, using context, where substitutions have occurred.  "

## 2024-01-02 ENCOUNTER — RA CDI HCC (OUTPATIENT)
Dept: OTHER | Facility: HOSPITAL | Age: 64
End: 2024-01-02

## 2024-01-02 NOTE — PROGRESS NOTES
HCC coding opportunities          Chart Reviewed number of suggestions sent to Provider: 2     Patients Insurance     Medicare Insurance: Medicare        G35  E66.01

## 2024-01-03 ENCOUNTER — OFFICE VISIT (OUTPATIENT)
Dept: FAMILY MEDICINE CLINIC | Facility: CLINIC | Age: 64
End: 2024-01-03
Payer: MEDICARE

## 2024-01-03 VITALS
SYSTOLIC BLOOD PRESSURE: 120 MMHG | TEMPERATURE: 97 F | WEIGHT: 278.8 LBS | OXYGEN SATURATION: 95 % | HEART RATE: 90 BPM | BODY MASS INDEX: 42.25 KG/M2 | DIASTOLIC BLOOD PRESSURE: 82 MMHG | HEIGHT: 68 IN

## 2024-01-03 DIAGNOSIS — I10 ESSENTIAL HYPERTENSION: ICD-10-CM

## 2024-01-03 DIAGNOSIS — R30.0 DYSURIA: ICD-10-CM

## 2024-01-03 DIAGNOSIS — N39.0 URINARY TRACT INFECTION WITHOUT HEMATURIA, SITE UNSPECIFIED: Primary | ICD-10-CM

## 2024-01-03 DIAGNOSIS — G35 MULTIPLE SCLEROSIS (HCC): ICD-10-CM

## 2024-01-03 PROBLEM — I25.10 CAD (CORONARY ARTERY DISEASE): Status: ACTIVE | Noted: 2021-08-30

## 2024-01-03 PROBLEM — E78.5 HYPERLIPIDEMIA LDL GOAL <70: Status: ACTIVE | Noted: 2021-07-06

## 2024-01-03 LAB
SL AMB  POCT GLUCOSE, UA: ABNORMAL
SL AMB LEUKOCYTE ESTERASE,UA: 500
SL AMB POCT BILIRUBIN,UA: ABNORMAL
SL AMB POCT BLOOD,UA: 80
SL AMB POCT CLARITY,UA: ABNORMAL
SL AMB POCT COLOR,UA: ABNORMAL
SL AMB POCT KETONES,UA: ABNORMAL
SL AMB POCT NITRITE,UA: ABNORMAL
SL AMB POCT PH,UA: 6
SL AMB POCT SPECIFIC GRAVITY,UA: 1.02
SL AMB POCT URINE PROTEIN: 1
SL AMB POCT UROBILINOGEN: 3.5

## 2024-01-03 PROCEDURE — 87077 CULTURE AEROBIC IDENTIFY: CPT | Performed by: NURSE PRACTITIONER

## 2024-01-03 PROCEDURE — 87086 URINE CULTURE/COLONY COUNT: CPT | Performed by: NURSE PRACTITIONER

## 2024-01-03 PROCEDURE — 87186 SC STD MICRODIL/AGAR DIL: CPT | Performed by: NURSE PRACTITIONER

## 2024-01-03 PROCEDURE — 99214 OFFICE O/P EST MOD 30 MIN: CPT | Performed by: NURSE PRACTITIONER

## 2024-01-03 PROCEDURE — 81002 URINALYSIS NONAUTO W/O SCOPE: CPT | Performed by: NURSE PRACTITIONER

## 2024-01-03 RX ORDER — SULFAMETHOXAZOLE AND TRIMETHOPRIM 800; 160 MG/1; MG/1
1 TABLET ORAL 2 TIMES DAILY
Qty: 14 TABLET | Refills: 0 | Status: SHIPPED | OUTPATIENT
Start: 2024-01-03 | End: 2024-01-10

## 2024-01-03 NOTE — PATIENT INSTRUCTIONS
Urinary Tract Infection in Men   AMBULATORY CARE:   A urinary tract infection (UTI)  is caused by bacteria that get inside your urinary tract. Your urinary tract includes your kidneys, ureters, bladder, and urethra. A UTI is more common in your lower urinary tract, which includes your bladder and urethra.       Common symptoms include the following:   Urinating more often than usual, leaking urine, or waking from sleep to urinate    Pain or burning when you urinate    Pain or pressure in your lower abdomen or back    Urine that smells bad    Blood in your urine    Seek care immediately if:   You are urinating very little or not at all.    You have a high fever with shaking chills.    You have side or back pain that gets worse.    Call your doctor if:   You have a fever.    You do not feel better after 2 days of taking antibiotics.    You are vomiting.    You have new symptoms, such as blood or pus in your urine.    You have questions or concerns about your condition or care.    Treatment for a UTI  may include medicines to treat a bacterial infection. You may also need medicines to decrease pain and burning, or decrease the urge to urinate often.  Prevent a UTI:   Empty your bladder often.  Urinate and empty your bladder as soon as you feel the need. Do not hold your urine for long periods of time.    Drink liquids as directed.  Ask how much liquid to drink each day and which liquids are best for you. You may need to drink more liquids than usual to help flush out the bacteria. Do not drink alcohol, caffeine, or citrus juices. These can irritate your bladder and increase your symptoms. Your healthcare provider may recommend cranberry juice to help prevent a UTI.    Urinate after you have sex.  This can help flush out bacteria passed during sex.    Do pelvic muscle exercises often.  Pelvic muscle exercises may help you start and stop urinating. Strong pelvic muscles may help you empty your bladder easier. Squeeze  these muscles tightly for 5 seconds like you are trying to hold back urine. Then relax for 5 seconds. Gradually work up to squeezing for 10 seconds. Do 3 sets of 15 repetitions a day, or as directed.    Follow up with your doctor as directed:  Write down your questions so you remember to ask them during your visits.  © Copyright [employer] 2023 Information is for End User's use only and may not be sold, redistributed or otherwise used for commercial purposes.  The above information is an  only. It is not intended as medical advice for individual conditions or treatments. Talk to your doctor, nurse or pharmacist before following any medical regimen to see if it is safe and effective for you.

## 2024-01-03 NOTE — ASSESSMENT & PLAN NOTE
Stable on current treatment.  Follows neurology at [facility].  To continue follow-up as scheduled.

## 2024-01-03 NOTE — ASSESSMENT & PLAN NOTE
Blood pressure managed in office today.  Advised increase hydration, avoidance of excess sodium, to continue on Hyzaar as prescribed.

## 2024-01-03 NOTE — PROGRESS NOTES
Name: Jesús Sainz      : 1960      MRN: [id number]  Encounter Provider: OSKAR Arriola  Encounter Date: 1/3/2024   Encounter department: [facility] [address]    Assessment & Plan     1. Urinary tract infection without hematuria, site unspecified  Comments:  Advised increased hydration with water, frequent urination, avoid bladder irritants like caffeine, Bactrim as prescribed.  Orders:  -     sulfamethoxazole-trimethoprim (BACTRIM DS) 800-160 mg per tablet; Take 1 tablet by mouth 2 (two) times a day for 7 days    2. Dysuria  -     POCT urine dip  -     Urine culture; Future  -     Urine culture    3. Multiple sclerosis (HCC)  Assessment & Plan:  Stable on current treatment.  Follows neurology at [facility].  To continue follow-up as scheduled.      4. Essential hypertension  Assessment & Plan:  Blood pressure managed in office today.  Advised increase hydration, avoidance of excess sodium, to continue on Hyzaar as prescribed.             Subjective      Urinary Tract Infection   This is a new problem. The current episode started in the past 7 days. The problem occurs every urination. The problem has been gradually worsening. The quality of the pain is described as burning. The pain is at a severity of 3/10. The pain is mild. There has been no fever. He is Not sexually active. There is No history of pyelonephritis. Pertinent negatives include no chills, discharge, flank pain, frequency, hematuria, hesitancy, nausea, possible pregnancy, sweats, urgency or vomiting. He has tried nothing for the symptoms. His past medical history is significant for kidney stones and recurrent UTIs. There is no history of catheterization, a single kidney, urinary stasis or a urological procedure.     Review of Systems   Constitutional:  Negative for chills.   HENT:  Negative for sore throat and trouble swallowing.    Respiratory:  Negative for cough and shortness of breath.     Gastrointestinal:  Negative for abdominal pain, nausea and vomiting.   Genitourinary:  Positive for dysuria. Negative for decreased urine volume, flank pain, frequency, hematuria, hesitancy, penile discharge, testicular pain and urgency.   Musculoskeletal:  Negative for arthralgias, back pain and myalgias.   Skin:  Negative for color change and rash.   Neurological:  Negative for dizziness and light-headedness.   Hematological:  Negative for adenopathy.   Psychiatric/Behavioral:  Negative for confusion.        Current Outpatient Medications on File Prior to Visit   Medication Sig    aspirin 81 mg chewable tablet Chew 81 mg    atorvastatin (LIPITOR) 40 mg tablet     chlorhexidine (PERIDEX) 0.12 % solution USE TWICE A DAY AS DIRECTED (DONT SWALLOW)    Cholecalciferol 125 MCG/ML LIQD Take 5,000 Units by mouth    Ciclopirox 0.77 % gel     EPINEPHrine (EPIPEN) 0.3 mg/0.3 mL SOAJ     Glatiramer Acetate 40 MG/ML SOSY INJECT ONE SYRINGE SUBCUTANEOUSLY 3 TIMES A WEEK AT LEAST 48 HOURS APART. ALLOW TO WARM TO ROOM TEMP FOR 20 MINUTES. REFRIGERATE.    ketoconazole (NIZORAL) 2 % shampoo Apply topically    losartan-hydrochlorothiazide (HYZAAR) 50-12.5 mg per tablet     promethazine-dextromethorphan (PHENERGAN-DM) 6.25-15 mg/5 mL oral syrup Take 5 mL by mouth 4 (four) times a day as needed for cough    tacrolimus (PROTOPIC) 0.1 % ointment APPLY TOPICALLY TWICE DAILY FOR 14 DAYS    ALPRAZolam (XANAX) 0.5 mg tablet Take 0.5 mg by mouth Three times daily as needed (Patient not taking: Reported on 12/17/2023)    [DISCONTINUED] amoxicillin (AMOXIL) 500 mg capsule  (Patient not taking: Reported on 12/17/2023)    [DISCONTINUED] Glatiramer Acetate 40 MG/ML SOSY     [DISCONTINUED] losartan (COZAAR) 50 mg tablet  (Patient not taking: Reported on 12/17/2023)    [DISCONTINUED] losartan (COZAAR) 50 mg tablet Take 1 tablet by mouth daily    [DISCONTINUED] naproxen (NAPROSYN) 500 mg tablet Take 1 tablet (500 mg total) by mouth every 12  "(twelve) hours as needed for mild pain or moderate pain (Patient not taking: Reported on 11/18/2023)       Objective     /82 (BP Location: Left arm, Patient Position: Sitting, Cuff Size: Standard)   Pulse 90   Temp (!) 97 °F (36.1 °C) (Tympanic)   Ht 5' 8\" (1.727 m)   Wt 126 kg (278 lb 12.8 oz)   SpO2 95%   BMI 42.39 kg/m²     Physical Exam  Constitutional:       General: He is not in acute distress.     Appearance: Normal appearance. He is not ill-appearing.   HENT:      Head: Normocephalic and atraumatic.      Right Ear: Tympanic membrane, ear canal and external ear normal.      Left Ear: Tympanic membrane, ear canal and external ear normal.      Nose: Nose normal.      Mouth/Throat:      Mouth: Mucous membranes are moist.      Pharynx: Oropharynx is clear.   Eyes:      Conjunctiva/sclera: Conjunctivae normal.      Pupils: Pupils are equal, round, and reactive to light.   Cardiovascular:      Rate and Rhythm: Normal rate and regular rhythm.      Pulses: Normal pulses.      Heart sounds: Normal heart sounds.   Pulmonary:      Effort: Pulmonary effort is normal.      Breath sounds: Normal breath sounds.   Abdominal:      General: Bowel sounds are normal.      Palpations: Abdomen is soft.      Tenderness: There is no abdominal tenderness. There is no right CVA tenderness or left CVA tenderness.   Musculoskeletal:         General: Normal range of motion.      Cervical back: Normal range of motion and neck supple.   Skin:     General: Skin is warm and dry.   Neurological:      General: No focal deficit present.      Mental Status: He is alert and oriented to person, place, and time.   Psychiatric:         Mood and Affect: Mood normal.         Behavior: Behavior normal.       OSKAR Arriola    "

## 2024-01-05 LAB — BACTERIA UR CULT: ABNORMAL

## 2024-01-10 ENCOUNTER — EVALUATION (OUTPATIENT)
Dept: PHYSICAL THERAPY | Facility: CLINIC | Age: 64
End: 2024-01-10
Payer: MEDICARE

## 2024-01-10 DIAGNOSIS — S46.812D PARTIAL TEAR OF LEFT SUBSCAPULARIS TENDON, SUBSEQUENT ENCOUNTER: ICD-10-CM

## 2024-01-10 DIAGNOSIS — S46.212D BICEPS STRAIN, LEFT, SUBSEQUENT ENCOUNTER: ICD-10-CM

## 2024-01-10 DIAGNOSIS — M75.42 SUBACROMIAL IMPINGEMENT OF LEFT SHOULDER: ICD-10-CM

## 2024-01-10 DIAGNOSIS — M75.102 TEAR OF LEFT SUPRASPINATUS TENDON: Primary | ICD-10-CM

## 2024-01-10 PROCEDURE — 97161 PT EVAL LOW COMPLEX 20 MIN: CPT

## 2024-01-10 PROCEDURE — 97110 THERAPEUTIC EXERCISES: CPT

## 2024-01-10 NOTE — PROGRESS NOTES
----- Message from Annabel Bryant sent at 4/16/2021 11:19 AM CDT -----  Regarding: FW: Kidney stone treatment  Schedule with Eliseo for 6 months with a PSA recheck.     Dr. Carlos for a VV on 4/19/21 at 3:30 to discuss stone  ----- Message -----  From: Paola Cardona RN  Sent: 4/16/2021  11:13 AM CDT  To: [facility]  Subject: FW: Kidney stone treatment                       Please call and schedule this patient with Dr. Carlos for a VV on 4/19/21 at 3:30 to discuss stone management. Thank you  ----- Message -----  From: Kenton Carlos MD  Sent: 4/6/2021  To: Paola Cardona RN  Subject: FW: Kidney stone treatment                       Paola,  Can you please reschedule him. Papito Hernandez.  ----- Message -----  From: Angelika Muller RN  Sent: 1/22/2021   4:23 PM CDT  To: Kenton Carlos MD, #  Subject: Kidney stone treatment                           Hello-    I am Red's heart transplant coordinator.  I am reaching out to inquire about a plan for his kidney stone treatment.  He was seen by Dr. Carlos last Fall to discuss treatment, but had just been hospitalized with pneumonia.  He also has an elevated PSA.    Red has recovered from the pneumonia, and is able to undergo any procedures now to treat his kidney stones.  Thank You!  Angelika Muller RN  Post-Heart Transplant Coordinator           PT Evaluation     Today's date: 1/10/2024  Patient name: Jesús Sainz  : 1960  MRN: [id number]  Referring provider: Balwinder Linares*  Dx:   Encounter Diagnosis     ICD-10-CM    1. Tear of left supraspinatus tendon  M75.102 Ambulatory Referral to Physical Therapy      2. Partial tear of left subscapularis tendon, subsequent encounter  S46.812D Ambulatory Referral to Physical Therapy      3. Biceps strain, left, subsequent encounter  S46.212D Ambulatory Referral to Physical Therapy      4. Subacromial impingement of left shoulder  M75.42 Ambulatory Referral to Physical Therapy                     Assessment  Assessment details: Jesús Sainz is a 63 y.o. male presenting to physical therapy for an initial evaluation with a MD referral of tear of left supraspinatus tendon, partial tear of left subscapularis tendon, biceps strain left, subacromial impingement of left shoulder. The patient demonstrates decreased and painful left shoulder AROM, decreased shoulder strength, and reports difficulty with overhead reaching and lifting as well as pushing/pulling with his left arm. The patient has a prior right shoulder RTC repair in  and therefore reports that he relies heavily on this shoulder to complete all activities. These deficits have limited the patient's ability to complete ADLs, avocational responsibilities, and recreational activities. This patient would benefit from OP PT services to address their impairments and functional limitations to maximize functional mobility. The patient was educated on anatomy and pathology of RTC tears, expected outcomes with conservative management, as well as provided a HEP. He demonstrated/verbalized understanding all education and denied questions at end of session.   Impairments: abnormal or restricted ROM, activity intolerance, impaired physical strength, lacks appropriate home exercise program, pain with function and poor posture     Symptom irritability:  "moderateUnderstanding of Dx/Px/POC: excellent   Prognosis: good    Goals  STG to be achieved in 4 weeks:   The patient will increase left shoulder abduction AROM to at least 140 degrees to allow for improved functional mobility.   The patient will increase left shoulder ER MMT score to at least 4/5 to allow for improved functional mobility.     LTG to be achieved by DC:   The patient will be independent in comprehensive HEP.   The patient will improve all left shoulder AROM to WFL to allow for improved functional mobility.   The patient will increase all left shoulder MMT score to WFL to allow for improved functional mobility.   The patient will be able to complete overhead lifting of 10 lbs without pain or difficulty.  The patient will be able to complete pushing/pulling required to complete activities in his garage without pain or difficulty.       Plan  Patient would benefit from: skilled physical therapy  Planned modality interventions: thermotherapy: hydrocollator packs, TENS and cryotherapy  Planned therapy interventions: manual therapy, joint mobilization, neuromuscular re-education, patient education, flexibility, strengthening, stretching, therapeutic activities, therapeutic exercise, home exercise program and IASTM  Frequency: 2x week  Duration in weeks: 8  Plan of Care beginning date: 1/10/2024  Plan of Care expiration date: 3/6/2024  Treatment plan discussed with: patient        Subjective Evaluation    History of Present Illness  Mechanism of injury:  Jesús presents to therapy with a chief complaint of left shoulder pain from lifting injury on 10/13/23. He says that at the time he was lifting bags of pellets onto a hand truck to transport them into his garage when he felt intense pain in the shoulder. He saw orthopedics on 11/1/23 at which time he was referred for an MRI of the shoulder. MRI was completed on 11/12/23 which showed \"insertional tear of the superior bundle of the subscapularis tendon with " "associated medial dislocation of the long head biceps tendon into the substance of the subscapularis. Partial-thickness insertional tear of the posterior supraspinatus tendon. No muscle atrophy. Mild glenohumeral osteoarthritis with associated circumferential, degenerative-type free edge tearing of the glenoid labrum\". He had a follow up with orthopedics on 23 and was provided with a corticosteroid injection in the shoulder as well as advised to continue with conservative management of the shoulder with a referral to OP PT. He reports that the injection greatly helped with pain in the shoulder, however he does still have mild pain and difficulty with lifting objects around his house to shoulder height and overhead. He also reports difficulty with pushing/pulling objects in his garage. He reports that he has also been avoiding reaching his shoulder behind his back at this time for fear of re-aggravating his shoulder.     Patient Goals  Patient goals for therapy: increased strength  Patient goal: gain strength pain in the shoulder so I can retrun to lifting around my house/garage  Pain  Current pain ratin  At best pain ratin  At worst pain ratin  Location: anterior aspect of shoulder  Quality: stabbing.  Relieving factors: change in position and medications  Aggravating factors: overhead activity and lifting  Progression: improved    Social Support  Lives with: spouse    Employment status: not working  Hand dominance: right      Diagnostic Tests  MRI studies: abnormal  Treatments  Previous treatment: injection treatment and medication  Current treatment: physical therapy        Objective     Postural Observations  Seated posture: fair      Palpation   Left   No palpable tenderness to the biceps, levator scapulae, lower trapezius, teres major, teres minor and upper trapezius.   Tenderness of the pectoralis major, pectoralis minor and supraspinatus.     Tenderness     Left Shoulder   Tenderness in the " "supraspinatus tendon. No tenderness in the AC joint, biceps tendon (proximal), clavicle, lateral scapula, medial scapula and scapular spine.     Active Range of Motion   Left Shoulder   Flexion: 148 degrees with pain  Abduction: 112 degrees with pain  External rotation BTH: Active external rotation behind the head: occiput.   Internal rotation BTB: L5 with pain    Right Shoulder   Flexion: 138 degrees   Abduction: 138 degrees   External rotation BTH: T1   Internal rotation BTB: L5     Passive Range of Motion   Left Shoulder   Flexion: 150 (\"tight\") degrees   Abduction: 115 (\"tight\") degrees   External rotation 45°: 58 (\"tight\" \"discomfort\") degrees   Internal rotation 45°: 44 (\"tight\" \"discomfort\") degrees     Additional Passive Range of Motion Details  Muscle guarding with all L shoulder PROM    Strength/Myotome Testing     Left Shoulder     Planes of Motion   Flexion: 4-   Abduction: 4-   External rotation at 0°: 3+ (pain)   Internal rotation at 0°: 3+ (pain)     Right Shoulder     Planes of Motion   Flexion: 4+   Abduction: 4+   External rotation at 0°: 4+   Internal rotation at 0°: 4+              Precautions: MS- Balance Deficits, HTN, R RTC repair 2016  Access Code: [email or web address]    POC expires Unit limit Auth  expiration date PT/OT + Visit Limit?   3/6/24 N/A N/A BOMN                 Visit/Unit Tracking  AUTH Status:  Date 1/10              BOMN Used 1               Remaining                   Manuals 1/10            L shoulder PROM                                                    Neuro Re-Ed             TB rows             TB pulldowns             Bent over rows             Bent over Y,T,I             MB on wall up/down, left/right, cw/ccw                                       Ther Ex             UBE fwd and retro for shoulder ROM             Wall slides flex, scapt     Add TB when able HEP            TB ER             TB IR             Standing shoulder 3 way raises              Corner pec stretch HEP          "   Pt education PT POC, HEP, anatomy, pathophysiology                         Ther Activity                                       Gait Training                                       Modalities

## 2024-01-11 ENCOUNTER — OFFICE VISIT (OUTPATIENT)
Dept: PHYSICAL THERAPY | Facility: CLINIC | Age: 64
End: 2024-01-11
Payer: MEDICARE

## 2024-01-11 DIAGNOSIS — S46.212D BICEPS STRAIN, LEFT, SUBSEQUENT ENCOUNTER: ICD-10-CM

## 2024-01-11 DIAGNOSIS — S46.812D PARTIAL TEAR OF LEFT SUBSCAPULARIS TENDON, SUBSEQUENT ENCOUNTER: ICD-10-CM

## 2024-01-11 DIAGNOSIS — M75.42 SUBACROMIAL IMPINGEMENT OF LEFT SHOULDER: ICD-10-CM

## 2024-01-11 DIAGNOSIS — M75.102 TEAR OF LEFT SUPRASPINATUS TENDON: Primary | ICD-10-CM

## 2024-01-11 PROCEDURE — 97112 NEUROMUSCULAR REEDUCATION: CPT

## 2024-01-11 PROCEDURE — 97140 MANUAL THERAPY 1/> REGIONS: CPT

## 2024-01-11 PROCEDURE — 97110 THERAPEUTIC EXERCISES: CPT

## 2024-01-11 NOTE — PROGRESS NOTES
"Daily Note     Today's date: 1/10/2024  Patient name: Jesús Sainz  : 1960  MRN: [id number]  Referring provider: Balwinder Linares*  Dx:   Encounter Diagnosis     ICD-10-CM    1. Tear of left supraspinatus tendon  M75.102       2. Partial tear of left subscapularis tendon, subsequent encounter  S46.812D       3. Biceps strain, left, subsequent encounter  S46.212D       4. Subacromial impingement of left shoulder  M75.42                      Subjective: Patient denies any changes to shoulder.       Objective: See treatment diary below      Assessment: Initiated treatment as outlined below; tolerated treatment well. He was able to complete all exercises without increased pain in shoulder, however did have muscular fatigue. Educated on DOMS; verbalized understanding. Patient demonstrated fatigue post treatment, exhibited good technique with therapeutic exercises, and would benefit from continued PT      Plan: Continue per plan of care.      Precautions: MS- Balance Deficits, HTN, R RTC repair   Access Code: [email or web address]    POC expires Unit limit Auth  expiration date PT/OT + Visit Limit?   3/6/24 N/A N/A BOMN                 Visit/Unit Tracking  AUTH Status:  Date 1/10 11/11             BOMN Used 1 2              Remaining                   Manuals 1/10 1/11           L shoulder PROM  BE                                                  Neuro Re-Ed             TB rows  Blue 5\" 2x10            TB pulldowns  Blue 5\" 2x10           Bent over rows             Bent over Y,T,I             MB on wall up/down, left/right, cw/ccw  RMB 20x ea dir                                     Ther Ex             UBE fwd and retro for shoulder ROM  4'/4'           Wall slides flex, scapt     Add TB when able HEP            TB ER  GTB 2x10           TB IR  GTB 2x10           Standing shoulder 3 way raises              Corner pec stretch HEP 3x30\"           Pt education PT POC, HEP, anatomy, pathophysiology DOMS                "         Ther Activity                                       Gait Training                                       Modalities

## 2024-01-16 ENCOUNTER — OFFICE VISIT (OUTPATIENT)
Dept: PHYSICAL THERAPY | Facility: CLINIC | Age: 64
End: 2024-01-16
Payer: MEDICARE

## 2024-01-16 DIAGNOSIS — S46.812D PARTIAL TEAR OF LEFT SUBSCAPULARIS TENDON, SUBSEQUENT ENCOUNTER: ICD-10-CM

## 2024-01-16 DIAGNOSIS — M75.102 TEAR OF LEFT SUPRASPINATUS TENDON: Primary | ICD-10-CM

## 2024-01-16 DIAGNOSIS — S46.212D BICEPS STRAIN, LEFT, SUBSEQUENT ENCOUNTER: ICD-10-CM

## 2024-01-16 DIAGNOSIS — M75.42 SUBACROMIAL IMPINGEMENT OF LEFT SHOULDER: ICD-10-CM

## 2024-01-16 PROCEDURE — 97140 MANUAL THERAPY 1/> REGIONS: CPT

## 2024-01-16 PROCEDURE — 97110 THERAPEUTIC EXERCISES: CPT

## 2024-01-16 PROCEDURE — 97112 NEUROMUSCULAR REEDUCATION: CPT

## 2024-01-16 NOTE — PROGRESS NOTES
"Daily Note     Today's date: 2024  Patient name: Jesús Sainz  : 1960  MRN: [id number]  Referring provider: Balwinder Linares*  Dx:   Encounter Diagnosis     ICD-10-CM    1. Tear of left supraspinatus tendon  M75.102       2. Partial tear of left subscapularis tendon, subsequent encounter  S46.812D       3. Biceps strain, left, subsequent encounter  S46.212D       4. Subacromial impingement of left shoulder  M75.42                      Subjective: Patient reports that he had muscle soreness after his last session, however no additional pain or soreness afterwards.       Objective: See treatment diary below      Assessment: Tolerated treatment well. Added bent over rows to facilitate improved shoulder stability and scapular engagement. He continues to be challenged with MB rolls on wall secondary to fatigue. Patient demonstrated fatigue post treatment, exhibited good technique with therapeutic exercises, and would benefit from continued PT      Plan: Continue per plan of care.      Precautions: MS- Balance Deficits, HTN, R RTC repair   Access Code: [email or web address]    POC expires Unit limit Auth  expiration date PT/OT + Visit Limit?   3/6/24 N/A N/A BOMN                 Visit/Unit Tracking  AUTH Status:  Date 1/10 11/11 1/16            BOMN Used 1 2 3             Remaining                   Manuals 1/10 1/11 1/16          L shoulder PROM  BE BE                                                 Neuro Re-Ed             TB rows  Blue 5\" 2x10  Blue 5\" 2x10           TB pulldowns  Blue 5\" 2x10 Blue 5\" 2x10           Bent over rows   4# 2x10          Bent over Y,T,I             MB on wall up/down, left/right, cw/ccw  RMB 20x ea dir RMB 20x ea dir                                    Ther Ex             UBE fwd and retro for shoulder ROM  4'/4' 4'/4'          Wall slides flex, scapt     Add TB when able HEP            TB ER  GTB 2x10 GTB 2x10          TB IR  GTB 2x10 GTB 2x10          Standing shoulder 3 way " "raises              Corner pec stretch HEP 3x30\" 3x30\"          Pt education PT POC, HEP, anatomy, pathophysiology DOMS                        Ther Activity                                       Gait Training                                       Modalities                                              "

## 2024-01-18 ENCOUNTER — OFFICE VISIT (OUTPATIENT)
Dept: PHYSICAL THERAPY | Facility: CLINIC | Age: 64
End: 2024-01-18
Payer: MEDICARE

## 2024-01-18 DIAGNOSIS — S46.212D BICEPS STRAIN, LEFT, SUBSEQUENT ENCOUNTER: ICD-10-CM

## 2024-01-18 DIAGNOSIS — S46.812D PARTIAL TEAR OF LEFT SUBSCAPULARIS TENDON, SUBSEQUENT ENCOUNTER: ICD-10-CM

## 2024-01-18 DIAGNOSIS — M75.102 TEAR OF LEFT SUPRASPINATUS TENDON: Primary | ICD-10-CM

## 2024-01-18 DIAGNOSIS — M75.42 SUBACROMIAL IMPINGEMENT OF LEFT SHOULDER: ICD-10-CM

## 2024-01-18 PROCEDURE — 97112 NEUROMUSCULAR REEDUCATION: CPT

## 2024-01-18 PROCEDURE — 97110 THERAPEUTIC EXERCISES: CPT

## 2024-01-18 PROCEDURE — 97140 MANUAL THERAPY 1/> REGIONS: CPT

## 2024-01-18 NOTE — PROGRESS NOTES
"Daily Note     Today's date: 2024  Patient name: Jesús Sainz  : 1960  MRN: [id number]  Referring provider: Balwinder Linares*  Dx:   Encounter Diagnosis     ICD-10-CM    1. Tear of left supraspinatus tendon  M75.102       2. Partial tear of left subscapularis tendon, subsequent encounter  S46.812D       3. Biceps strain, left, subsequent encounter  S46.212D       4. Subacromial impingement of left shoulder  M75.42           Start Time: 843  Stop Time: 923  Total time in clinic (min): 40 minutes    Subjective: Pt noted that he has been feeling better since starting PT.       Objective: See treatment diary below      Assessment:  Continued with treatment session w/ progressions. Proper scapular retraction noted with all exercises completed this session. Challenged noted with ball on wall technique. Tolerated treatment well. Patient exhibited good technique with therapeutic exercises and would benefit from continued PT.     Education reviewed with patient with verbal agreement and understanding.   - HEP updated and received an updated paper copy this visit.   - DOMS 24 to 48 hours of muscle soreness.       Plan: Continue per plan of care.      Precautions: MS- Balance Deficits, HTN, R RTC repair   Access Code: [email or web address]    POC expires Unit limit Auth  expiration date PT/OT + Visit Limit?   3/6/24 N/A N/A BOMN                 Visit/Unit Tracking  AUTH Status:  Date 1/10 11/11 1/16 1/18           BOMN Used 1 2 3 4            Remaining                   Manuals 1/10 1/11 1/16 1/18         L shoulder PROM  BE BE SC                                                Neuro Re-Ed             TB rows  Blue 5\" 2x10  Blue 5\" 2x10  Blue 2x 10          TB pulldowns  Blue 5\" 2x10 Blue 5\" 2x10  Blue 2x 10          Bent over rows   4# 2x10 4# 2x 10          Bent over Y,T,I    1# 2x 10 I and Y  Add T NV        MB on wall up/down, left/right, cw/ccw  RMB 20x ea dir RMB 20x ea dir RMB 20x ea. Dir.                 " "                   Ther Ex             UBE fwd and retro for shoulder ROM  4'/4' 4'/4' 4'/4'         Wall slides flex, scapt     Add TB when able HEP            TB ER  GTB 2x10 GTB 2x10 GTB 2x 10          TB IR  GTB 2x10 GTB 2x10 GTB 2x 10          Standing shoulder 3 way raises              Corner pec stretch HEP 3x30\" 3x30\" 3x 30\"          Pt education PT POC, HEP, anatomy, pathophysiology DOMS                        Ther Activity                                       Gait Training                                       Modalities                                                "

## 2024-01-19 NOTE — PROGRESS NOTES
"Daily Note     Today's date: 2024  Patient name: Jesús Sainz  : 1960  MRN: [id number]  Referring provider: Balwinder Linares*  Dx:   Encounter Diagnosis     ICD-10-CM    1. Tear of left supraspinatus tendon  M75.102       2. Partial tear of left subscapularis tendon, subsequent encounter  S46.812D       3. Biceps strain, left, subsequent encounter  S46.212D       4. Subacromial impingement of left shoulder  M75.42                      Subjective: ***      Objective: See treatment diary below      Assessment: Tolerated treatment {Tolerated treatment :[id number]}. Patient {assessment:9129507880}      Plan: {PLAN:2102013800}     Precautions: MS- Balance Deficits, HTN, R RTC repair   Access Code: [email or web address]    POC expires Unit limit Auth  expiration date PT/OT + Visit Limit?   3/6/24 N/A N/A BOMN                 Visit/Unit Tracking  AUTH Status:  Date 1/10 11/11 1/16 1/18           BOMN Used 1 2 3 4            Remaining                   Manuals 1/10 1/11 1/16 1/18         L shoulder PROM  BE BE SC                                                Neuro Re-Ed             TB rows  Blue 5\" 2x10  Blue 5\" 2x10  Blue 2x 10          TB pulldowns  Blue 5\" 2x10 Blue 5\" 2x10  Blue 2x 10          Bent over rows   4# 2x10 4# 2x 10          Bent over Y,T,I    1# 2x 10 I and Y  Add T NV        MB on wall up/down, left/right, cw/ccw  RMB 20x ea dir RMB 20x ea dir RMB 20x ea. Dir.                                    Ther Ex             UBE fwd and retro for shoulder ROM  4'/4' 4'/4' 4'/4'         Wall slides flex, scapt     Add TB when able HEP            TB ER  GTB 2x10 GTB 2x10 GTB 2x 10          TB IR  GTB 2x10 GTB 2x10 GTB 2x 10          Standing shoulder 3 way raises              Corner pec stretch HEP 3x30\" 3x30\" 3x 30\"          Pt education PT POC, HEP, anatomy, pathophysiology DOMS                        Ther Activity                                       Gait Training                                     "   Modalities

## 2024-01-22 ENCOUNTER — APPOINTMENT (OUTPATIENT)
Dept: PHYSICAL THERAPY | Facility: CLINIC | Age: 64
End: 2024-01-22
Payer: MEDICARE

## 2024-01-22 DIAGNOSIS — S46.212D BICEPS STRAIN, LEFT, SUBSEQUENT ENCOUNTER: ICD-10-CM

## 2024-01-22 DIAGNOSIS — S46.812D PARTIAL TEAR OF LEFT SUBSCAPULARIS TENDON, SUBSEQUENT ENCOUNTER: ICD-10-CM

## 2024-01-22 DIAGNOSIS — M75.42 SUBACROMIAL IMPINGEMENT OF LEFT SHOULDER: ICD-10-CM

## 2024-01-22 DIAGNOSIS — M75.102 TEAR OF LEFT SUPRASPINATUS TENDON: Primary | ICD-10-CM

## 2024-01-24 ENCOUNTER — OFFICE VISIT (OUTPATIENT)
Dept: FAMILY MEDICINE CLINIC | Facility: CLINIC | Age: 64
End: 2024-01-24
Payer: MEDICARE

## 2024-01-24 VITALS
SYSTOLIC BLOOD PRESSURE: 120 MMHG | HEIGHT: 68 IN | BODY MASS INDEX: 41.56 KG/M2 | TEMPERATURE: 98.5 F | DIASTOLIC BLOOD PRESSURE: 72 MMHG | OXYGEN SATURATION: 96 % | HEART RATE: 104 BPM | WEIGHT: 274.2 LBS

## 2024-01-24 DIAGNOSIS — R05.1 ACUTE COUGH: ICD-10-CM

## 2024-01-24 DIAGNOSIS — J40 BRONCHITIS: Primary | ICD-10-CM

## 2024-01-24 DIAGNOSIS — I10 ESSENTIAL HYPERTENSION: ICD-10-CM

## 2024-01-24 LAB
SARS-COV-2 AG UPPER RESP QL IA: NEGATIVE
SL AMB POCT RAPID FLU A: NEGATIVE
SL AMB POCT RAPID FLU B: NEGATIVE
VALID CONTROL: NORMAL

## 2024-01-24 PROCEDURE — 99214 OFFICE O/P EST MOD 30 MIN: CPT | Performed by: NURSE PRACTITIONER

## 2024-01-24 PROCEDURE — 87804 INFLUENZA ASSAY W/OPTIC: CPT | Performed by: NURSE PRACTITIONER

## 2024-01-24 PROCEDURE — 87811 SARS-COV-2 COVID19 W/OPTIC: CPT | Performed by: NURSE PRACTITIONER

## 2024-01-24 RX ORDER — PREDNISONE 10 MG/1
TABLET ORAL
Qty: 30 TABLET | Refills: 0 | Status: SHIPPED | OUTPATIENT
Start: 2024-01-24

## 2024-01-24 RX ORDER — ALBUTEROL SULFATE 90 UG/1
2 AEROSOL, METERED RESPIRATORY (INHALATION) EVERY 6 HOURS PRN
Qty: 18 G | Refills: 0 | Status: SHIPPED | OUTPATIENT
Start: 2024-01-24

## 2024-01-24 RX ORDER — AZITHROMYCIN 250 MG/1
TABLET, FILM COATED ORAL
Qty: 6 TABLET | Refills: 0 | Status: SHIPPED | OUTPATIENT
Start: 2024-01-24 | End: 2024-01-28

## 2024-01-24 NOTE — ASSESSMENT & PLAN NOTE
Blood pressure managed in office today.  Advised to continue on current antihypertensives as prescribed.  Advised to avoid products that contain DM in them as this can possibly raise blood pressure.  Can use plain Mucinex twice daily or Coricidin HBP.

## 2024-01-24 NOTE — PROGRESS NOTES
Name: Jesús Sainz      : 1960      MRN: [id number]  Encounter Provider: OSKAR Arriola  Encounter Date: 2024   Encounter department: [facility] [address]    Assessment & Plan     1. Bronchitis  Comments:  Advised increased rest, hydration, steam, humidified air, avoid irritants, Vicks at night, medications as prescribed  Orders:  -     azithromycin (ZITHROMAX) 250 mg tablet; Take 2 tablets today then 1 tablet daily x 4 days  -     predniSONE 10 mg tablet; 50 mg p.o. qd x2 days, 40 mg p.o. qd x2 days, 30 mg p.o. qd x2 days, 20 mg p.o. qd x2 days, and 10 mg p.o. qd x2 days. Take with food  -     albuterol (ProAir HFA) 90 mcg/act inhaler; Inhale 2 puffs every 6 (six) hours as needed for wheezing or shortness of breath    2. Acute cough  Comments:  Advised increased hydration, rest, steam, humidified air, saline rinses twice daily, avoid irritants, Vicks at night, Mucinex twice daily  Orders:  -     POCT Rapid Covid Ag  -     POCT rapid flu A and B    3. Essential hypertension  Assessment & Plan:  Blood pressure managed in office today.  Advised to continue on current antihypertensives as prescribed.  Advised to avoid products that contain DM in them as this can possibly raise blood pressure.  Can use plain Mucinex twice daily or Coricidin HBP.             Subjective      Patient presents to the office accompanied by wife for evaluation of cough.  Symptoms began 3 days with cough and chest congestion.  Developed diarrhea, + chills.  + sick contacts at home.  + wheezing at night time.  Patient denies respiratory distress, chest pain, weakness, syncope      Review of Systems   Constitutional:  Positive for appetite change and chills. Negative for fatigue and fever.   HENT:  Negative for congestion, ear pain, sinus pressure, sinus pain and sore throat.    Eyes:  Negative for photophobia and visual disturbance.   Respiratory:  Positive for cough, chest tightness  "and wheezing. Negative for shortness of breath.    Cardiovascular:  Negative for chest pain and palpitations.   Gastrointestinal:  Positive for diarrhea. Negative for abdominal pain, nausea and vomiting.   Genitourinary:  Negative for decreased urine volume.   Musculoskeletal:  Negative for arthralgias and myalgias.   Skin:  Negative for color change and rash.   Neurological:  Negative for dizziness, tremors, light-headedness, numbness and headaches.   Hematological:  Negative for adenopathy.   Psychiatric/Behavioral:  Negative for confusion.        Current Outpatient Medications on File Prior to Visit   Medication Sig    aspirin 81 mg chewable tablet Chew 81 mg    atorvastatin (LIPITOR) 40 mg tablet     chlorhexidine (PERIDEX) 0.12 % solution USE TWICE A DAY AS DIRECTED (DONT SWALLOW)    Cholecalciferol 125 MCG/ML LIQD Take 5,000 Units by mouth    Ciclopirox 0.77 % gel     EPINEPHrine (EPIPEN) 0.3 mg/0.3 mL SOAJ     Glatiramer Acetate 40 MG/ML SOSY INJECT ONE SYRINGE SUBCUTANEOUSLY 3 TIMES A WEEK AT LEAST 48 HOURS APART. ALLOW TO WARM TO ROOM TEMP FOR 20 MINUTES. REFRIGERATE.    ketoconazole (NIZORAL) 2 % shampoo Apply topically    losartan-hydrochlorothiazide (HYZAAR) 50-12.5 mg per tablet     promethazine-dextromethorphan (PHENERGAN-DM) 6.25-15 mg/5 mL oral syrup Take 5 mL by mouth 4 (four) times a day as needed for cough    tacrolimus (PROTOPIC) 0.1 % ointment APPLY TOPICALLY TWICE DAILY FOR 14 DAYS    ALPRAZolam (XANAX) 0.5 mg tablet Take 0.5 mg by mouth Three times daily as needed (Patient not taking: Reported on 12/17/2023)       Objective     /72 (BP Location: Left arm, Patient Position: Sitting, Cuff Size: Standard)   Pulse 104   Temp 98.5 °F (36.9 °C) (Tympanic)   Ht 5' 8\" (1.727 m)   Wt 124 kg (274 lb 3.2 oz)   SpO2 96%   BMI 41.69 kg/m²     Physical Exam  Vitals reviewed.   Constitutional:       General: He is not in acute distress.     Appearance: Normal appearance. He is not ill-appearing. "   HENT:      Head: Normocephalic and atraumatic.      Right Ear: Tympanic membrane, ear canal and external ear normal.      Left Ear: Tympanic membrane, ear canal and external ear normal.      Nose: No congestion.      Mouth/Throat:      Mouth: Mucous membranes are moist.      Pharynx: Oropharynx is clear. No posterior oropharyngeal erythema.   Eyes:      Conjunctiva/sclera: Conjunctivae normal.      Pupils: Pupils are equal, round, and reactive to light.   Cardiovascular:      Rate and Rhythm: Normal rate and regular rhythm.      Pulses: Normal pulses.      Heart sounds: Normal heart sounds. No murmur heard.  Pulmonary:      Effort: Pulmonary effort is normal.      Breath sounds: No decreased air movement. Examination of the right-upper field reveals wheezing. Examination of the left-upper field reveals wheezing. Examination of the right-middle field reveals wheezing. Examination of the left-middle field reveals wheezing. Wheezing present.   Abdominal:      General: Bowel sounds are normal.      Palpations: Abdomen is soft.      Tenderness: There is no abdominal tenderness.   Musculoskeletal:         General: Normal range of motion.      Cervical back: Normal range of motion and neck supple.   Lymphadenopathy:      Cervical: No cervical adenopathy.   Skin:     General: Skin is warm and dry.      Capillary Refill: Capillary refill takes less than 2 seconds.   Neurological:      General: No focal deficit present.      Mental Status: He is alert and oriented to person, place, and time.   Psychiatric:         Mood and Affect: Mood normal.         Behavior: Behavior normal.       OSKAR Arriola

## 2024-01-26 ENCOUNTER — APPOINTMENT (OUTPATIENT)
Dept: PHYSICAL THERAPY | Facility: CLINIC | Age: 64
End: 2024-01-26
Payer: MEDICARE

## 2024-01-30 ENCOUNTER — OFFICE VISIT (OUTPATIENT)
Dept: PHYSICAL THERAPY | Facility: CLINIC | Age: 64
End: 2024-01-30
Payer: MEDICARE

## 2024-01-30 DIAGNOSIS — M75.42 SUBACROMIAL IMPINGEMENT OF LEFT SHOULDER: ICD-10-CM

## 2024-01-30 DIAGNOSIS — S46.212D BICEPS STRAIN, LEFT, SUBSEQUENT ENCOUNTER: ICD-10-CM

## 2024-01-30 DIAGNOSIS — M75.102 TEAR OF LEFT SUPRASPINATUS TENDON: Primary | ICD-10-CM

## 2024-01-30 DIAGNOSIS — S46.812D PARTIAL TEAR OF LEFT SUBSCAPULARIS TENDON, SUBSEQUENT ENCOUNTER: ICD-10-CM

## 2024-01-30 PROCEDURE — 97140 MANUAL THERAPY 1/> REGIONS: CPT

## 2024-01-30 PROCEDURE — 97110 THERAPEUTIC EXERCISES: CPT

## 2024-01-30 PROCEDURE — 97112 NEUROMUSCULAR REEDUCATION: CPT

## 2024-01-30 NOTE — PROGRESS NOTES
"Daily Note     Today's date: 2024  Patient name: Jesús Sainz  : 1960  MRN: [id number]  Referring provider: Balwinder Linares*  Dx:   Encounter Diagnosis     ICD-10-CM    1. Tear of left supraspinatus tendon  M75.102       2. Partial tear of left subscapularis tendon, subsequent encounter  S46.812D       3. Subacromial impingement of left shoulder  M75.42       4. Biceps strain, left, subsequent encounter  S46.212D                      Subjective: Patient noted no new complaints.       Objective: See treatment diary below      Assessment: Tolerated treatment well.  Patient noted challenged but able to perform ball on wall with RMB.Patient exhibited good technique with therapeutic exercises and would benefit from continued PT      Plan: Continue per plan of care.      Precautions: MS- Balance Deficits, HTN, R RTC repair   Access Code: [email or web address]    POC expires Unit limit Auth  expiration date PT/OT + Visit Limit?   3/6/24 N/A N/A BOMN                 Visit/Unit Tracking  AUTH Status:  Date 1/10 11/11 1/16 1/18 1/30          BOMN Used 1 2 3 4 5           Remaining                   Manuals 1/10 1/11 1/16 1/18 1/30        L shoulder PROM  BE BE SC AC                                               Neuro Re-Ed             TB rows  Blue 5\" 2x10  Blue 5\" 2x10  Blue 2x 10  Blue 2 x 10        TB pulldowns  Blue 5\" 2x10 Blue 5\" 2x10  Blue 2x 10  Blue 2 x 10        Bent over rows   4# 2x10 4# 2x 10  4# 2 x 10        Bent over Y,T,I    1# 2x 10 I and Y  Add T 1# I,Y   2 x 10        MB on wall up/down, left/right, cw/ccw  RMB 20x ea dir RMB 20x ea dir RMB 20x ea. Dir.  RMB 20x ea                                  Ther Ex             UBE fwd and retro for shoulder ROM  4'/4' 4'/4' 4'/4' 4'4'        Wall slides flex, scapt     Add TB when able HEP            TB ER  GTB 2x10 GTB 2x10 GTB 2x 10  GTB 2 x 10        TB IR  GTB 2x10 GTB 2x10 GTB 2x 10  GTB 2 x 10        Standing shoulder 3 way raises            " "  Corner pec stretch HEP 3x30\" 3x30\" 3x 30\"  3 x 30\"        Pt education PT POC, HEP, anatomy, pathophysiology DOMS                        Ther Activity                                       Gait Training                                       Modalities                                                  "

## 2024-02-02 ENCOUNTER — OFFICE VISIT (OUTPATIENT)
Dept: PHYSICAL THERAPY | Facility: CLINIC | Age: 64
End: 2024-02-02
Payer: MEDICARE

## 2024-02-02 DIAGNOSIS — S46.212D BICEPS STRAIN, LEFT, SUBSEQUENT ENCOUNTER: ICD-10-CM

## 2024-02-02 DIAGNOSIS — S46.812D PARTIAL TEAR OF LEFT SUBSCAPULARIS TENDON, SUBSEQUENT ENCOUNTER: ICD-10-CM

## 2024-02-02 DIAGNOSIS — M75.42 SUBACROMIAL IMPINGEMENT OF LEFT SHOULDER: ICD-10-CM

## 2024-02-02 DIAGNOSIS — M75.102 TEAR OF LEFT SUPRASPINATUS TENDON: Primary | ICD-10-CM

## 2024-02-02 PROCEDURE — 97110 THERAPEUTIC EXERCISES: CPT

## 2024-02-02 PROCEDURE — 97112 NEUROMUSCULAR REEDUCATION: CPT

## 2024-02-02 PROCEDURE — 97140 MANUAL THERAPY 1/> REGIONS: CPT

## 2024-02-02 NOTE — PROGRESS NOTES
"Daily Note     Today's date: 2024  Patient name: Jesús Sainz  : 1960  MRN: [id number]  Referring provider: Balwinder Linares*  Dx:   Encounter Diagnosis     ICD-10-CM    1. Tear of left supraspinatus tendon  M75.102       2. Partial tear of left subscapularis tendon, subsequent encounter  S46.812D       3. Subacromial impingement of left shoulder  M75.42       4. Biceps strain, left, subsequent encounter  S46.212D           Start Time: 850  Stop Time: 933  Total time in clinic (min): 43 minutes    Subjective: Pt noted that he has been feeling good and better since stating PT.       Objective: See treatment diary below      Assessment:  Continued with treatment session with focus on L shoulder.  Pt noted no pain with progressions added at this time. Demonstrated proper form and technique with scapular setting.  Tolerated treatment well. Patient exhibited good technique with therapeutic exercises and would benefit from continued PT.         Plan: Continue per plan of care.      Precautions: MS- Balance Deficits, HTN, R RTC repair   Access Code: [email or web address]    POC expires Unit limit Auth  expiration date PT/OT + Visit Limit?   3/6/24 N/A N/A BOMN                 Visit/Unit Tracking  AUTH Status:  Date 1/10 11/11 1/16 1/18 1/30 2/2         BOMN Used 1 2 3 4 5 6           Remaining                   Manuals 1/10 1/11 1/16 1/18 1/30 2/2       L shoulder PROM  BE BE SC AC SC                                              Neuro Re-Ed             TB rows  Blue 5\" 2x10  Blue 5\" 2x10  Blue 2x 10  Blue 2 x 10 Blue 3x 10        TB pulldowns  Blue 5\" 2x10 Blue 5\" 2x10  Blue 2x 10  Blue 2 x 10 Blue 3x 10        Bent over rows   4# 2x10 4# 2x 10  4# 2 x 10 4# 2x 10        Bent over Y,T,I    1# 2x 10 I and Y  Add T 1# I,Y   2 x 10 T 1# I,Y   2 x 10       MB on wall up/down, left/right, cw/ccw  RMB 20x ea dir RMB 20x ea dir RMB 20x ea. Dir.  RMB 20x ea RB 20x                                  Ther Ex           " "  UBE fwd and retro for shoulder ROM  4'/4' 4'/4' 4'/4' 4'4' 4'/4'       Wall slides flex, scapt     Add TB when able HEP            TB ER  GTB 2x10 GTB 2x10 GTB 2x 10  GTB 2 x 10 GTB 2x 10        TB IR  GTB 2x10 GTB 2x10 GTB 2x 10  GTB 2 x 10 GTB 2x 10        Standing shoulder 3 way raises              Corner pec stretch HEP 3x30\" 3x30\" 3x 30\"  3 x 30\" 3x 30\"        Pt education PT POC, HEP, anatomy, pathophysiology DOMS                        Ther Activity                                       Gait Training                                       Modalities                                                    "

## 2024-02-06 ENCOUNTER — APPOINTMENT (OUTPATIENT)
Dept: PHYSICAL THERAPY | Facility: CLINIC | Age: 64
End: 2024-02-06
Payer: MEDICARE

## 2024-02-08 ENCOUNTER — TELEPHONE (OUTPATIENT)
Dept: CARDIOLOGY CLINIC | Facility: CLINIC | Age: 64
End: 2024-02-08

## 2024-02-08 NOTE — TELEPHONE ENCOUNTER
Pt's wife Edita called & stated that she recently saw  in the hospital & would like to know if  will take on her  as a new patient.       Please Advise, Edita can be reached at [phone]

## 2024-02-08 NOTE — TELEPHONE ENCOUNTER
He can be added to the schedule the same day as his wife if there is a spot.  If not, appointment some other day.

## 2024-02-09 ENCOUNTER — EVALUATION (OUTPATIENT)
Dept: PHYSICAL THERAPY | Facility: CLINIC | Age: 64
End: 2024-02-09
Payer: MEDICARE

## 2024-02-09 DIAGNOSIS — M75.102 TEAR OF LEFT SUPRASPINATUS TENDON: Primary | ICD-10-CM

## 2024-02-09 DIAGNOSIS — M75.42 SUBACROMIAL IMPINGEMENT OF LEFT SHOULDER: ICD-10-CM

## 2024-02-09 DIAGNOSIS — S46.212D BICEPS STRAIN, LEFT, SUBSEQUENT ENCOUNTER: ICD-10-CM

## 2024-02-09 DIAGNOSIS — S46.812D PARTIAL TEAR OF LEFT SUBSCAPULARIS TENDON, SUBSEQUENT ENCOUNTER: ICD-10-CM

## 2024-02-09 PROCEDURE — 97140 MANUAL THERAPY 1/> REGIONS: CPT

## 2024-02-09 PROCEDURE — 97110 THERAPEUTIC EXERCISES: CPT

## 2024-02-09 NOTE — PROGRESS NOTES
PT Discharge    Today's date: 2024  Patient name: Jesús Sainz  : 1960  MRN: [id number]  Referring provider: Balwinder Linares*  Dx:   Encounter Diagnosis     ICD-10-CM    1. Tear of left supraspinatus tendon  M75.102       2. Partial tear of left subscapularis tendon, subsequent encounter  S46.812D       3. Subacromial impingement of left shoulder  M75.42       4. Biceps strain, left, subsequent encounter  S46.212D                      Assessment  Assessment details: Jesús Sainz is a 63 y.o. male presenting to physical therapy for a reevaluation with a MD referral of tear of left supraspinatus tendon, partial tear of left subscapularis tendon, biceps strain left, subacromial impingement of left shoulder. Since their initial evaluation, he reports 100% improvement in his shoulder. The patient has demonstrated improved shoulder ROM and strength to WFL as well as abolished pain in the shoulder with functional mobility. He has met his goals, is independent with his HEP and is to be DC to a comprehensive HEP at this time. He was provided with an updated version and TB for completion at home and demonstrated/verbalized understanding it's completion.  Understanding of Dx/Px/POC: excellent   Prognosis: good    Goals  STG to be achieved in 4 weeks:   The patient will increase left shoulder abduction AROM to at least 140 degrees to allow for improved functional mobility. MET  The patient will increase left shoulder ER MMT score to at least 4/5 to allow for improved functional mobility. MET    LTG to be achieved by DC:   The patient will be independent in comprehensive HEP. MET  The patient will improve all left shoulder AROM to WFL to allow for improved functional mobility. MET  The patient will increase all left shoulder MMT score to WFL to allow for improved functional mobility. MET  The patient will be able to complete overhead lifting of 10 lbs without pain or difficulty.MET  The patient will be  able to complete pushing/pulling required to complete activities in his garage without pain or difficulty. MET      Plan  Plan of Care beginning date: 2/9/2024  Plan of Care expiration date: 2/9/2024  Treatment plan discussed with: patient        Subjective Evaluation    History of Present Illness  Mechanism of injury: Jesús reports 100% improvement since beginning PT services. He notes improvement in his pain since initial evaluation. He says that he doesn't experience any pain in the shoulder with any movements of the arm and lifting tasks. He says that he is able to lift his arm overhead, out to the side, and behind his back without any limitations or difficulty. He says that he can work in his garage without any difficulties or limitations. He says that he is still taking the supplements that were recommended by orthopedics and feels that these also greatly help his overall health, pain and inflammation, even from MS.      Patient Goals  Patient goals for therapy: increased strength  Patient goal: gain strength pain in the shoulder so I can retrun to lifting around my house/garage  Pain  No pain reported  Progression: resolved    Social Support  Lives with: spouse    Employment status: not working  Hand dominance: right      Diagnostic Tests  MRI studies: abnormal  Treatments  Previous treatment: injection treatment and medication  Current treatment: physical therapy        Objective     Postural Observations  Seated posture: good      Palpation   Left   No palpable tenderness to the biceps, levator scapulae, lower trapezius, pectoralis major, pectoralis minor, supraspinatus, teres major, teres minor and upper trapezius.     Tenderness     Left Shoulder   No tenderness in the AC joint, biceps tendon (proximal), clavicle, lateral scapula, medial scapula, scapular spine and supraspinatus tendon.     Active Range of Motion   Left Shoulder   Flexion: 160 degrees   Abduction: 160 degrees   External rotation BTH: T1  "  Internal rotation BTB: T10     Right Shoulder   Flexion: 138 degrees   Abduction: 138 degrees   External rotation BTH: T1   Internal rotation BTB: L5     Passive Range of Motion   Left Shoulder   Normal passive range of motion    Strength/Myotome Testing     Left Shoulder     Planes of Motion   Flexion: 4+   Abduction: 4+   External rotation at 0°: 4   Internal rotation at 0°: 4+     Right Shoulder     Planes of Motion   Flexion: 4+   Abduction: 4+   External rotation at 0°: 4+   Internal rotation at 0°: 4+              Precautions: MS- Balance Deficits, HTN, R RTC repair 2016  Access Code: [email or web address]    POC expires Unit limit Auth  expiration date PT/OT + Visit Limit?   3/6/24 N/A N/A BOMN                 Visit/Unit Tracking  AUTH Status:  Date 1/10 11/11 1/16 1/18 1/30 2/2 2/9        BOMN Used 1 2 3 4 5 6  7         Remaining                   Manuals 1/10 1/11 1/16 1/18 1/30 2/2 2/9      L shoulder PROM  BE BE SC AC SC                    Re-evaluation       BE                   Neuro Re-Ed             TB rows  Blue 5\" 2x10  Blue 5\" 2x10  Blue 2x 10  Blue 2 x 10 Blue 3x 10        TB pulldowns  Blue 5\" 2x10 Blue 5\" 2x10  Blue 2x 10  Blue 2 x 10 Blue 3x 10        Bent over rows   4# 2x10 4# 2x 10  4# 2 x 10 4# 2x 10        Bent over Y,T,I    1# 2x 10 I and Y  Add T 1# I,Y   2 x 10 T 1# I,Y   2 x 10       MB on wall up/down, left/right, cw/ccw  RMB 20x ea dir RMB 20x ea dir RMB 20x ea. Dir.  RMB 20x ea RMB 20x                                  Ther Ex             UBE fwd and retro for shoulder ROM  4'/4' 4'/4' 4'/4' 4'4' 4'/4' 4'/4'      Wall slides flex, scapt     Add TB when able HEP            TB ER  GTB 2x10 GTB 2x10 GTB 2x 10  GTB 2 x 10 GTB 2x 10        TB IR  GTB 2x10 GTB 2x10 GTB 2x 10  GTB 2 x 10 GTB 2x 10        Standing shoulder 3 way raises              Corner pec stretch HEP 3x30\" 3x30\" 3x 30\"  3 x 30\" 3x 30\"        Pt education PT POC, HEP, anatomy, pathophysiology DOMS     HEP review                 "   Ther Activity                                       Gait Training                                       Modalities

## 2024-02-12 ENCOUNTER — OFFICE VISIT (OUTPATIENT)
Dept: FAMILY MEDICINE CLINIC | Facility: CLINIC | Age: 64
End: 2024-02-12
Payer: MEDICARE

## 2024-02-12 VITALS
SYSTOLIC BLOOD PRESSURE: 122 MMHG | TEMPERATURE: 96.1 F | HEIGHT: 68 IN | DIASTOLIC BLOOD PRESSURE: 86 MMHG | OXYGEN SATURATION: 96 % | HEART RATE: 88 BPM | WEIGHT: 279.6 LBS | BODY MASS INDEX: 42.37 KG/M2

## 2024-02-12 DIAGNOSIS — R05.1 ACUTE COUGH: ICD-10-CM

## 2024-02-12 DIAGNOSIS — E66.01 MORBID OBESITY WITH BMI OF 40.0-44.9, ADULT (HCC): ICD-10-CM

## 2024-02-12 DIAGNOSIS — G35 MULTIPLE SCLEROSIS (HCC): Primary | ICD-10-CM

## 2024-02-12 PROCEDURE — 99214 OFFICE O/P EST MOD 30 MIN: CPT | Performed by: FAMILY MEDICINE

## 2024-02-12 NOTE — PROGRESS NOTES
Assessment/Plan:     Chronic Problems:  No problem-specific Assessment & Plan notes found for this encounter.      Visit Diagnosis:  Diagnoses and all orders for this visit:    Multiple sclerosis ([facility])  Comments:  Continue current care [facility] neurology, referral placed for in [facility] neurologist  Orders:  -     Ambulatory Referral to Neurology; Future    Morbid obesity with BMI of 40.0-44.9, adult (HCC)  Comments:  Encourage weight loss strategies    Acute cough  Comments:  Resolved          Subjective:    Patient ID: Jesús Sainz is a 63 y.o. male.    Follow-up cough  Doing well essentially resolved, presently without complaints  Presently on no medications to include OTCs  Negative shortness of breath difficulty breathing wheezing negative swelling to extremities  Previous treatments Zithromax steroid burst albuterol  Heating system forced hot air, use of humidifier    Multiple sclerosis,  Initial diagnosis approximately 2020, continues on Copaxone, [facility] provider retiring will need local in network neurologist for continuation of care   past medical history to include DVT left leg following shoulder surgery 2016, obesity,          The following portions of the patient's history were reviewed and updated as appropriate: allergies, current medications, past family history, past medical history, past social history, past surgical history and problem list.    Review of Systems   Constitutional:  Negative for appetite change, chills, fever and unexpected weight change.   HENT:  Negative for congestion, dental problem, ear pain, hearing loss, postnasal drip, rhinorrhea, sinus pressure, sinus pain, sneezing, sore throat, tinnitus and voice change.    Eyes:  Negative for visual disturbance.   Respiratory:  Negative for apnea, cough, chest tightness and shortness of breath.    Cardiovascular:  Negative for chest pain, palpitations and leg swelling.   Gastrointestinal:  Negative for abdominal pain, blood in stool,  "constipation, diarrhea, nausea and vomiting.   Endocrine: Negative for cold intolerance, heat intolerance, polydipsia, polyphagia and polyuria.   Genitourinary:  Negative for decreased urine volume, difficulty urinating, dysuria, frequency and hematuria.   Musculoskeletal:  Negative for arthralgias, back pain, gait problem, joint swelling and myalgias.   Skin:  Negative for color change, rash and wound.   Allergic/Immunologic: Negative for environmental allergies and food allergies.   Neurological:  Negative for dizziness, syncope, weakness, light-headedness, numbness and headaches.   Hematological:  Negative for adenopathy. Does not bruise/bleed easily.   Psychiatric/Behavioral:  Negative for sleep disturbance and suicidal ideas. The patient is not nervous/anxious.          /86 (BP Location: Left arm, Patient Position: Sitting, Cuff Size: Standard)   Pulse 88   Temp (!) 96.1 °F (35.6 °C) (Tympanic)   Ht 5' 8\" (1.727 m)   Wt 127 kg (279 lb 9.6 oz)   SpO2 96%   BMI 42.51 kg/m²   Social History     Socioeconomic History    Marital status: /Civil Union     Spouse name: Not on file    Number of children: Not on file    Years of education: Not on file    Highest education level: Not on file   Occupational History    Not on file   Tobacco Use    Smoking status: Never     Passive exposure: Past    Smokeless tobacco: Never    Tobacco comments:     Don’t smoke   Vaping Use    Vaping status: Never Used   Substance and Sexual Activity    Alcohol use: Never    Drug use: Never    Sexual activity: Yes     Partners: Female     Comment: Hysterectomy   Other Topics Concern    Not on file   Social History Narrative    Not on file     Social Determinants of Health     Financial Resource Strain: Not on file   Food Insecurity: Not on file   Transportation Needs: Not on file   Physical Activity: Not on file   Stress: Not on file   Social Connections: Not on file   Intimate Partner Violence: Not on file   Housing " Stability: Not on file     Past Medical History:   Diagnosis Date    Arthritis 2015    In my hands, rt shoulder    Cancer (HCC) 2010    Basil twice in nose    Diabetes mellitus (HCC)     Borderline    Diverticulitis of colon     Colon oscapy they found it    GERD (gastroesophageal reflux disease)     [facility] did throat test, said i have    Heart murmur     Told i was born with it. Had checked not pronounced    High blood pressure         HL (hearing loss)     From work    Hypertension     Kidney stone     Had stones twice    Memory loss     From ms short term loss    MS (multiple sclerosis) ([facility])         Urinary tract infection     Had infection last year     Family History   Problem Relation Age of Onset    Cancer Father          from clear cell carcinoma in kidney’s    Prostate cancer Father     Hypertension Father     Nephrolithiasis Father     Diabetes Mother     Hypertension Mother     Cancer Paternal Uncle         Kidney cancer    Cancer Brother         clear cell carcinma    Hypertension Sister     Diabetes Sister     Hyperlipidemia Sister     Hypertension Sister     Diabetes Sister     Hyperlipidemia Sister     No Known Problems Daughter     Bipolar disorder Sister     OCD Sister      Past Surgical History:   Procedure Laterality Date    BUNIONECTOMY Right     2002    CYST REMOVAL          FL MYELOGRAM LUMBAR  2020    NOSTRIL RETAINER INSERTION Bilateral            Current Outpatient Medications:     albuterol (ProAir HFA) 90 mcg/act inhaler, Inhale 2 puffs every 6 (six) hours as needed for wheezing or shortness of breath, Disp: 18 g, Rfl: 0    aspirin 81 mg chewable tablet, Chew 81 mg, Disp: , Rfl:     atorvastatin (LIPITOR) 40 mg tablet, , Disp: , Rfl:     chlorhexidine (PERIDEX) 0.12 % solution, USE TWICE A DAY AS DIRECTED (DONT SWALLOW), Disp: , Rfl:     Cholecalciferol 125 MCG/ML LIQD, Take 5,000 Units by mouth, Disp: , Rfl:     Ciclopirox 0.77 % gel, ,  Disp: , Rfl:     EPINEPHrine (EPIPEN) 0.3 mg/0.3 mL SOAJ, , Disp: , Rfl:     Glatiramer Acetate 40 MG/ML SOSY, INJECT ONE SYRINGE SUBCUTANEOUSLY 3 TIMES A WEEK AT LEAST 48 HOURS APART. ALLOW TO WARM TO ROOM TEMP FOR 20 MINUTES. REFRIGERATE., Disp: , Rfl:     ketoconazole (NIZORAL) 2 % shampoo, Apply topically, Disp: , Rfl:     losartan-hydrochlorothiazide (HYZAAR) 50-12.5 mg per tablet, , Disp: , Rfl:     predniSONE 10 mg tablet, 50 mg p.o. qd x2 days, 40 mg p.o. qd x2 days, 30 mg p.o. qd x2 days, 20 mg p.o. qd x2 days, and 10 mg p.o. qd x2 days. Take with food, Disp: 30 tablet, Rfl: 0    promethazine-dextromethorphan (PHENERGAN-DM) 6.25-15 mg/5 mL oral syrup, Take 5 mL by mouth 4 (four) times a day as needed for cough, Disp: 240 mL, Rfl: 0    tacrolimus (PROTOPIC) 0.1 % ointment, APPLY TOPICALLY TWICE DAILY FOR 14 DAYS, Disp: 100 g, Rfl: 0    ALPRAZolam (XANAX) 0.5 mg tablet, Take 0.5 mg by mouth Three times daily as needed (Patient not taking: Reported on 12/17/2023), Disp: , Rfl:     Allergies   Allergen Reactions    Mometasone Other (See Comments)     Throat Irritation          Lab Review   Office Visit on 01/24/2024   Component Date Value    POCT SARS-CoV-2 Ag 01/24/2024 Negative     VALID CONTROL 01/24/2024 Valid     RAPID FLU A 01/24/2024 Negative     RAPID FLU B 01/24/2024 Negative    Office Visit on 01/03/2024   Component Date Value    LEUKOCYTE ESTERASE,UA 01/03/2024 500     NITRITE,UA 01/03/2024 +     SL AMB POCT UROBILINOGEN 01/03/2024 3.5     POCT URINE PROTEIN 01/03/2024 1.0      PH,UA 01/03/2024 6.0     BLOOD,UA 01/03/2024 80     SPECIFIC GRAVITY,UA 01/03/2024 1.020     KETONES,UA 01/03/2024 -     BILIRUBIN,UA 01/03/2024 -     GLUCOSE, UA 01/03/2024 -      COLOR,UA 01/03/2024 orange     CLARITY,UA 01/03/2024 cloudy     Urine Culture 01/03/2024 80,000-89,000 cfu/ml Escherichia coli (A)         Imaging: No results found.    Objective:     Physical Exam  Constitutional:       General: He is not in acute  "distress.     Appearance: He is well-developed. He is obese. He is not ill-appearing or toxic-appearing.   HENT:      Head: Normocephalic and atraumatic.   Cardiovascular:      Rate and Rhythm: Normal rate and regular rhythm.      Heart sounds: Normal heart sounds.   Pulmonary:      Effort: Pulmonary effort is normal.      Breath sounds: Normal breath sounds.   Abdominal:      General: Bowel sounds are normal.      Palpations: Abdomen is soft.   Musculoskeletal:      Cervical back: Normal range of motion and neck supple.   Skin:     General: Skin is warm and dry.   Neurological:      Mental Status: He is alert and oriented to person, place, and time.      Deep Tendon Reflexes: Reflexes are normal and symmetric.   Psychiatric:         Behavior: Behavior normal.         Thought Content: Thought content normal.         Judgment: Judgment normal.           There are no Patient Instructions on file for this visit.    OSKAR Swan    Portions of the record may have been created with voice recognition software.  Occasional wrong word or \"sound a like\" substitutions may have occurred due to the inherent limitations of voice recognition software.  Read the chart carefully and recognize, using context, where substitutions have occurred.  "

## 2024-02-15 DIAGNOSIS — J40 BRONCHITIS: ICD-10-CM

## 2024-02-15 RX ORDER — ALBUTEROL SULFATE 90 UG/1
AEROSOL, METERED RESPIRATORY (INHALATION)
Qty: 8 G | Refills: 0 | Status: SHIPPED | OUTPATIENT
Start: 2024-02-15

## 2024-03-01 ENCOUNTER — OFFICE VISIT (OUTPATIENT)
Dept: PULMONOLOGY | Facility: CLINIC | Age: 64
End: 2024-03-01
Payer: MEDICARE

## 2024-03-01 VITALS
DIASTOLIC BLOOD PRESSURE: 80 MMHG | BODY MASS INDEX: 43.04 KG/M2 | SYSTOLIC BLOOD PRESSURE: 120 MMHG | RESPIRATION RATE: 16 BRPM | HEIGHT: 68 IN | OXYGEN SATURATION: 96 % | WEIGHT: 284 LBS | HEART RATE: 97 BPM | TEMPERATURE: 98.6 F

## 2024-03-01 DIAGNOSIS — R05.2 SUBACUTE COUGH: Primary | ICD-10-CM

## 2024-03-01 PROCEDURE — 99203 OFFICE O/P NEW LOW 30 MIN: CPT | Performed by: INTERNAL MEDICINE

## 2024-03-01 RX ORDER — ERGOCALCIFEROL 1.25 MG/1
50000 CAPSULE ORAL
COMMUNITY

## 2024-03-01 NOTE — PROGRESS NOTES
Pulmonary Outpatient Consultation Note   Jesús Sainz 63 y.o. male MRN: [id number]  3/1/2024    Referring provider: Bridget Swan  [address] [address]     Assessment/Plan:      Subacute cough  Patient had a postinfectious cough which has since resolved.  No active issues from pulmonary perspective.  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  I do not see any evidence on his imaging to suggest significant asbestos related lung disease.  He can follow-up with me on an as-needed basis.    Visit orders:    Diagnoses and all orders for this visit:    Subacute cough    Other orders  -     ergocalciferol (VITAMIN D2) 50,000 units; Take 50,000 Units by mouth  -     Turmeric 1053 MG TABS; Take by mouth  -     Tart Cherry 1200 MG CAPS; Take by mouth      History of Present Illness   HPI:  Jesús Sainz is a 63 y.o. male who is here today for evaluation regarding cough.  It is no pre-existing lung disease.  In December of this year, he developed bronchitis and was treated with a course of prednisone and azithromycin.  His symptoms resolved, but he developed another infection in January, again treated with azithromycin and prednisone.  He was also prescribed albuterol at that time.  When he was ill with his viral infection, he had significant chills and fatigue.  Those have since resolved.  His cough is 99% resolved as well.  He is also somewhat concerned about his overall lung health, having worked for many years in the [place] [employer] where he was exposed to asbestos.  He also has done welding over the years.  He has no shortness of breath.  He has no wheezing.  He has no chest pain.  He was recently diagnosed with multiple sclerosis with symptoms involving his vision, balance and neuropathy.  He is a lifelong non-smoker.    Review of Systems   Constitutional:  Negative for  appetite change and fever.   HENT:  Positive for rhinorrhea. Negative for ear pain, postnasal drip, sneezing, sore throat and trouble swallowing.    Respiratory:  Positive for cough and wheezing.    Cardiovascular:  Negative for chest pain.   Musculoskeletal:  Negative for myalgias.   Neurological:  Negative for headaches.   All other systems reviewed and are negative.      Historical Information   Past Medical History:   Diagnosis Date    Arthritis 2015    In my hands, rt shoulder    Cancer (HCC)     Basil twice in nose    Diabetes mellitus ([facility])     Borderline    Diverticulitis of colon     Colon oscapy they found it    GERD (gastroesophageal reflux disease)     [facility] did throat test, said i have    Heart murmur     Told i was born with it. Had checked not pronounced    High blood pressure         HL (hearing loss) 2016    From work    Hypertension     Kidney stone     Had stones twice    Memory loss     From ms short term loss    MS (multiple sclerosis) ([facility])         Urinary tract infection     Had infection last year     Past Surgical History:   Procedure Laterality Date    BUNIONECTOMY Right     2002    CYST REMOVAL          FL MYELOGRAM LUMBAR  2020    NOSTRIL RETAINER INSERTION Bilateral     's     Family History   Problem Relation Age of Onset    Cancer Father          from clear cell carcinoma in kidney’s    Prostate cancer Father     Hypertension Father     Nephrolithiasis Father     Diabetes Mother     Hypertension Mother     Cancer Paternal Uncle         Kidney cancer    Cancer Brother         clear cell carcinma    Hypertension Sister     Diabetes Sister     Hyperlipidemia Sister     Hypertension Sister     Diabetes Sister     Hyperlipidemia Sister     No Known Problems Daughter     Bipolar disorder Sister     OCD Sister        Social History     Tobacco Use   Smoking Status Never    Passive exposure: Past   Smokeless Tobacco Never   Tobacco Comments     "Don’t smoke       Meds/Allergies     Current Outpatient Medications:     aspirin 81 mg chewable tablet, Chew 81 mg, Disp: , Rfl:     atorvastatin (LIPITOR) 40 mg tablet, , Disp: , Rfl:     chlorhexidine (PERIDEX) 0.12 % solution, USE TWICE A DAY AS DIRECTED (DONT SWALLOW), Disp: , Rfl:     Cholecalciferol 125 MCG/ML LIQD, Take 5,000 Units by mouth, Disp: , Rfl:     Ciclopirox 0.77 % gel, , Disp: , Rfl:     EPINEPHrine (EPIPEN) 0.3 mg/0.3 mL SOAJ, , Disp: , Rfl:     ergocalciferol (VITAMIN D2) 50,000 units, Take 50,000 Units by mouth, Disp: , Rfl:     Glatiramer Acetate 40 MG/ML SOSY, INJECT ONE SYRINGE SUBCUTANEOUSLY 3 TIMES A WEEK AT LEAST 48 HOURS APART. ALLOW TO WARM TO ROOM TEMP FOR 20 MINUTES. REFRIGERATE., Disp: , Rfl:     ketoconazole (NIZORAL) 2 % shampoo, Apply topically, Disp: , Rfl:     losartan-hydrochlorothiazide (HYZAAR) 50-12.5 mg per tablet, , Disp: , Rfl:     promethazine-dextromethorphan (PHENERGAN-DM) 6.25-15 mg/5 mL oral syrup, Take 5 mL by mouth 4 (four) times a day as needed for cough, Disp: 240 mL, Rfl: 0    tacrolimus (PROTOPIC) 0.1 % ointment, APPLY TOPICALLY TWICE DAILY FOR 14 DAYS, Disp: 100 g, Rfl: 0    Tart Cherry 1200 MG CAPS, Take by mouth, Disp: , Rfl:     Turmeric 1053 MG TABS, Take by mouth, Disp: , Rfl:   Allergies   Allergen Reactions    Mometasone Other (See Comments)     Throat Irritation       Vitals: Blood pressure 120/80, pulse 97, temperature 98.6 °F (37 °C), temperature source Tympanic, resp. rate 16, height 5' 8\" (1.727 m), weight 129 kg (284 lb), SpO2 96%., Body mass index is 43.18 kg/m². Oxygen Therapy  SpO2: 96 %    Physical Exam   Physical Exam  Constitutional:       General: He is not in acute distress.     Appearance: Normal appearance.   HENT:      Head: Normocephalic.      Mouth/Throat:      Pharynx: No oropharyngeal exudate.   Eyes:      General: No scleral icterus.  Neck:      Vascular: No JVD.   Cardiovascular:      Rate and Rhythm: Normal rate and regular " "rhythm.   Pulmonary:      Breath sounds: No wheezing, rhonchi or rales.   Abdominal:      Palpations: Abdomen is soft.      Tenderness: There is no abdominal tenderness.   Musculoskeletal:         General: No deformity.      Cervical back: Neck supple.   Lymphadenopathy:      Cervical: No cervical adenopathy.   Skin:     General: Skin is warm and dry.   Neurological:      Mental Status: He is alert and oriented to person, place, and time.   Psychiatric:         Mood and Affect: Mood normal.         Labs: I have personally reviewed pertinent lab results.  No results found for: \"WBC\", \"HGB\", \"HCT\", \"MCV\", \"PLT\"  Lab Results   Component Value Date    CALCIUM 9.7 05/02/2023    K 4.2 05/02/2023    CO2 30 05/02/2023     05/02/2023    BUN 21 05/02/2023    CREATININE 0.96 05/02/2023     No results found for: \"IGE\"  No results found for: \"ALT\", \"AST\", \"GGT\", \"ALKPHOS\", \"BILITOT\"    Imaging and other studies: I have personally reviewed pertinent reports.    Chest x-ray on 10/13/2023 shows clear lungs    Visualized lung parenchyma on a CT renal protocol performed on 6/7/2023shows no evidence to suggest asbestos related lung disease or pleural plaque    Answers submitted by the patient for this visit:  Pulmonology Questionnaire (Submitted on 3/1/2024)  Chief Complaint: Primary symptoms  Chronicity: new  When did you first notice your symptoms?: more than 1 month ago  How often do your symptoms occur?: intermittently  Since you first noticed this problem, how has it changed?: gradually improving  Do you have shortness of breath that occurs with effort or exertion?: No  Do you have ear congestion?: No  Do you have heartburn?: No  Do you have fatigue?: No  Do you have nasal congestion?: No  Do you have shortness of breath when lying flat?: No  Do you have shortness of breath when you wake up?: No  Do you have sweats?: No  Have you experienced weight loss?: No  Which of the following makes your symptoms worse?: change in " weather  Which of the following makes your symptoms better?: prescription cough suppressant, rest  Risk factors for lung disease: occupational exposure

## 2024-03-01 NOTE — ASSESSMENT & PLAN NOTE
Patient had a postinfectious cough which has since resolved.  No active issues from pulmonary perspective.  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  I do not see any evidence on his imaging to suggest significant asbestos related lung disease.  He can follow-up with me on an as-needed basis.

## 2024-03-08 ENCOUNTER — OFFICE VISIT (OUTPATIENT)
Dept: CARDIOLOGY CLINIC | Facility: CLINIC | Age: 64
End: 2024-03-08
Payer: MEDICARE

## 2024-03-08 VITALS
WEIGHT: 271 LBS | RESPIRATION RATE: 16 BRPM | HEART RATE: 94 BPM | HEIGHT: 68 IN | DIASTOLIC BLOOD PRESSURE: 80 MMHG | BODY MASS INDEX: 41.07 KG/M2 | OXYGEN SATURATION: 95 % | SYSTOLIC BLOOD PRESSURE: 128 MMHG

## 2024-03-08 DIAGNOSIS — R06.02 SHORTNESS OF BREATH: ICD-10-CM

## 2024-03-08 DIAGNOSIS — E78.5 HYPERLIPIDEMIA LDL GOAL <70: ICD-10-CM

## 2024-03-08 DIAGNOSIS — E66.01 MORBID OBESITY WITH BMI OF 40.0-44.9, ADULT (HCC): ICD-10-CM

## 2024-03-08 DIAGNOSIS — I10 ESSENTIAL HYPERTENSION: Primary | ICD-10-CM

## 2024-03-08 PROCEDURE — 93000 ELECTROCARDIOGRAM COMPLETE: CPT | Performed by: INTERNAL MEDICINE

## 2024-03-08 PROCEDURE — 99204 OFFICE O/P NEW MOD 45 MIN: CPT | Performed by: INTERNAL MEDICINE

## 2024-03-08 RX ORDER — GLUCOSAMINE SULFATE 500 MG
1000 CAPSULE ORAL DAILY
COMMUNITY

## 2024-03-08 RX ORDER — LOSARTAN POTASSIUM AND HYDROCHLOROTHIAZIDE 12.5; 5 MG/1; MG/1
1 TABLET ORAL DAILY
Qty: 90 TABLET | Refills: 3 | Status: SHIPPED | OUTPATIENT
Start: 2024-03-08

## 2024-03-08 RX ORDER — ATORVASTATIN CALCIUM 40 MG/1
40 TABLET, FILM COATED ORAL DAILY
Qty: 90 TABLET | Refills: 3 | Status: SHIPPED | OUTPATIENT
Start: 2024-03-08

## 2024-03-08 NOTE — PROGRESS NOTES
Cardiology Consultation     Jesús Sainz  [id number]  1960  [address] [facility]  [facility] [place] [address]    This patient presents to establish cardiac care.  Does have history of hypertension patient had cardiac workup in [place] which is ordered echocardiogram as well as pharmacological stress test.  Pharmacological stress test was abnormal and subsequently underwent cardiac catheterization towards the end of 2021 which did not show any significant CAD.  Patient denies any history of chest pain.  Patient does have some shortness of breath which is stable.  He is trying to lose weight.  No history of leg edema orthopnea PND.  No history of palpitations or dizziness.  No history of smoking.    1. Essential hypertension  losartan-hydrochlorothiazide (HYZAAR) 50-12.5 mg per tablet    POCT ECG      2. Hyperlipidemia LDL goal <70  atorvastatin (LIPITOR) 40 mg tablet      3. Morbid obesity with BMI of 40.0-44.9, adult (HCC)        4. Shortness of breath  POCT ECG        Patient Active Problem List   Diagnosis    Multiple sclerosis (HCC)    Morbid obesity with BMI of 40.0-44.9, adult (HCC)    Contusion of right knee    Viral syndrome    Essential hypertension    CAD (coronary artery disease)    Hyperlipidemia LDL goal <70    Subacute cough     Past Medical History:   Diagnosis Date    Arthritis 2015    In my hands, rt shoulder    Cancer (HCC) 2010    [place] twice in nose    Diabetes mellitus (HCC)     Borderline    Diverticulitis of colon 2021    Colon oscapy they found it    GERD (gastroesophageal reflux disease) 2023    [facility] did throat test, said i have    Heart murmur 1960    Told i was born with it. Had checked not pronounced    High blood pressure     2021    HL (hearing loss) 2016    From work    Hypertension 2020    Kidney stone     Had stones twice    Memory loss 2020    From ms short term loss    MS  (multiple sclerosis) ([facility])         Urinary tract infection     Had infection last year     Social History     Socioeconomic History    Marital status: /Civil Union     Spouse name: Not on file    Number of children: Not on file    Years of education: Not on file    Highest education level: Not on file   Occupational History    Not on file   Tobacco Use    Smoking status: Never     Passive exposure: Past    Smokeless tobacco: Never    Tobacco comments:     Don’t smoke   Vaping Use    Vaping status: Never Used   Substance and Sexual Activity    Alcohol use: Never    Drug use: Never    Sexual activity: Yes     Partners: Female     Comment: Hysterectomy   Other Topics Concern    Not on file   Social History Narrative    Not on file     Social Determinants of Health     Financial Resource Strain: Not on file   Food Insecurity: Not on file   Transportation Needs: Not on file   Physical Activity: Not on file   Stress: Not on file   Social Connections: Not on file   Intimate Partner Violence: Not on file   Housing Stability: Not on file      Family History   Problem Relation Age of Onset    Cancer Father          from clear cell carcinoma in kidney’s    Prostate cancer Father     Hypertension Father     Nephrolithiasis Father     Diabetes Mother     Hypertension Mother     Cancer Paternal Uncle         Kidney cancer    Cancer Brother         clear cell carcinma    Hypertension Sister     Diabetes Sister     Hyperlipidemia Sister     Hypertension Sister     Diabetes Sister     Hyperlipidemia Sister     No Known Problems Daughter     Bipolar disorder Sister     OCD Sister      Past Surgical History:   Procedure Laterality Date    BUNIONECTOMY Right     2002    CYST REMOVAL          FL MYELOGRAM LUMBAR  2020    NOSTRIL RETAINER INSERTION Bilateral            Current Outpatient Medications:     aspirin 81 mg chewable tablet, Chew 81 mg, Disp: , Rfl:     atorvastatin (LIPITOR) 40 mg tablet, Take 1  "tablet (40 mg total) by mouth daily, Disp: 90 tablet, Rfl: 3    Cholecalciferol 125 MCG/ML LIQD, Take 5,000 Units by mouth, Disp: , Rfl:     Ciclopirox 0.77 % gel, , Disp: , Rfl:     EPINEPHrine (EPIPEN) 0.3 mg/0.3 mL SOAJ, , Disp: , Rfl:     ergocalciferol (VITAMIN D2) 50,000 units, Take 50,000 Units by mouth, Disp: , Rfl:     Glatiramer Acetate 40 MG/ML SOSY, INJECT ONE SYRINGE SUBCUTANEOUSLY 3 TIMES A WEEK AT LEAST 48 HOURS APART. ALLOW TO WARM TO ROOM TEMP FOR 20 MINUTES. REFRIGERATE., Disp: , Rfl:     glucosamine 500 MG CAPS capsule, Take 1,000 mg by mouth daily, Disp: , Rfl:     ketoconazole (NIZORAL) 2 % shampoo, Apply topically, Disp: , Rfl:     losartan-hydrochlorothiazide (HYZAAR) 50-12.5 mg per tablet, Take 1 tablet by mouth daily, Disp: 90 tablet, Rfl: 3    tacrolimus (PROTOPIC) 0.1 % ointment, APPLY TOPICALLY TWICE DAILY FOR 14 DAYS, Disp: 100 g, Rfl: 0    Tart Cherry 1200 MG CAPS, Take by mouth, Disp: , Rfl:     Turmeric 1053 MG TABS, Take by mouth, Disp: , Rfl:   Allergies   Allergen Reactions    Mometasone Other (See Comments)     Throat Irritation     Vitals:    03/08/24 1400   BP: 128/80   BP Location: Left arm   Patient Position: Sitting   Cuff Size: Standard   Pulse: 94   Resp: 16   SpO2: 95%   Weight: 123 kg (271 lb)   Height: 5' 8\" (1.727 m)       Labs:  Office Visit on 01/24/2024   Component Date Value    POCT SARS-CoV-2 Ag 01/24/2024 Negative     VALID CONTROL 01/24/2024 Valid     RAPID FLU A 01/24/2024 Negative     RAPID FLU B 01/24/2024 Negative      Imaging: No results found.    Review of Systems:  Review of Systems  REVIEW OF SYSTEMS:  Constitutional:  Denies fever or chills   Eyes:  Denies change in visual acuity   HENT:  Denies nasal congestion or sore throat   Respiratory:  shortness of breath   Cardiovascular:  Denies chest pain or edema   GI:  Denies abdominal pain, nausea, vomiting, bloody stools or diarrhea   :  Denies dysuria, frequency, difficulty in micturition and " nocturia  Musculoskeletal:  Denies back pain or joint pain   Neurologic:  Denies headache, focal weakness or sensory changes   Endocrine:  Denies polyuria or polydipsia   Lymphatic:  Denies swollen glands   Psychiatric:  Denies depression or anxiety      Physical Exam:  Physical Exam  PHYSICAL EXAM:  General:  Patient is not in acute distress   Head: Normocephalic, Atraumatic.  HEENT:  Both pupils normal-size atraumatic, normocephalic, nonicteric  Neck:  JVP not raised. Trachea central. No carotid bruit  Respiratory:  normal breath sounds no crackles. no rhonchi  Cardiovascular:  Regular rate and rhythm no S3 no murmurs  GI:  Abdomen soft nontender. No organomegaly.   Lymphatic:  No cervical or inguinal lymphadenopathy  Neurologic:  Patient is awake alert, oriented . Grossly nonfocal  Extremities no edema    EKG shows sinus rhythm and is within normal limits.    Discussion/Summary:    This patient has history of hypertension, hyperlipidemia as well as obesity.  Patient has been doing well from a cardiovascular standpoint.    Echocardiogram done in [place] in the past showed normal ejection fraction with no significant valvular abnormalities.    Patient had a pharmacological nuclear stress test in 2021 which was abnormal and patient subsequently underwent cardiac catheterization which did not show any significant CAD.    Patient will continue dietary and risk factor modification.  Patient counseled to lose weight to reduce cardiovascular morbidity and mortality.  Prescription for his medications reviewed and refilled.    Symptoms to watch out from cardiac standpoint which would indicate the need for further cardiac evaluation also discussed with patient and family.  Follow-up with primary care physician.  Follow-up in 6 months or earlier as needed.  Patient is agreeable with the plan of care.

## 2024-03-25 ENCOUNTER — TELEPHONE (OUTPATIENT)
Age: 64
End: 2024-03-25

## 2024-03-25 NOTE — TELEPHONE ENCOUNTER
Called spoke to patient re:upcoming appointment with  on 6/25/24 will be at [place] location. Pt agreed mailed new appointment card with change in time to 8 am.

## 2024-04-01 ENCOUNTER — APPOINTMENT (OUTPATIENT)
Dept: LAB | Facility: HOSPITAL | Age: 64
End: 2024-04-01
Payer: MEDICARE

## 2024-04-01 ENCOUNTER — OFFICE VISIT (OUTPATIENT)
Dept: FAMILY MEDICINE CLINIC | Facility: CLINIC | Age: 64
End: 2024-04-01
Payer: MEDICARE

## 2024-04-01 VITALS
HEART RATE: 88 BPM | OXYGEN SATURATION: 97 % | SYSTOLIC BLOOD PRESSURE: 128 MMHG | WEIGHT: 278.9 LBS | BODY MASS INDEX: 42.27 KG/M2 | HEIGHT: 68 IN | TEMPERATURE: 97.4 F | DIASTOLIC BLOOD PRESSURE: 86 MMHG

## 2024-04-01 DIAGNOSIS — E78.5 HYPERLIPIDEMIA LDL GOAL <70: ICD-10-CM

## 2024-04-01 DIAGNOSIS — Z12.5 SCREENING FOR PROSTATE CANCER: ICD-10-CM

## 2024-04-01 DIAGNOSIS — R73.01 IFG (IMPAIRED FASTING GLUCOSE): ICD-10-CM

## 2024-04-01 DIAGNOSIS — Z00.00 MEDICARE ANNUAL WELLNESS VISIT, SUBSEQUENT: ICD-10-CM

## 2024-04-01 DIAGNOSIS — I10 ESSENTIAL HYPERTENSION: ICD-10-CM

## 2024-04-01 DIAGNOSIS — I25.83 CORONARY ARTERY DISEASE DUE TO LIPID RICH PLAQUE: ICD-10-CM

## 2024-04-01 DIAGNOSIS — G35 MULTIPLE SCLEROSIS (HCC): ICD-10-CM

## 2024-04-01 DIAGNOSIS — I25.10 CORONARY ARTERY DISEASE DUE TO LIPID RICH PLAQUE: ICD-10-CM

## 2024-04-01 DIAGNOSIS — Z00.00 MEDICARE ANNUAL WELLNESS VISIT, INITIAL: ICD-10-CM

## 2024-04-01 DIAGNOSIS — G35 MULTIPLE SCLEROSIS (HCC): Primary | ICD-10-CM

## 2024-04-01 LAB
ALBUMIN SERPL BCP-MCNC: 4.3 G/DL (ref 3.5–5)
ALP SERPL-CCNC: 48 U/L (ref 34–104)
ALT SERPL W P-5'-P-CCNC: 19 U/L (ref 7–52)
ANION GAP SERPL CALCULATED.3IONS-SCNC: 7 MMOL/L (ref 4–13)
AST SERPL W P-5'-P-CCNC: 13 U/L (ref 13–39)
BASOPHILS # BLD AUTO: 0.06 THOUSANDS/ÂΜL (ref 0–0.1)
BASOPHILS NFR BLD AUTO: 1 % (ref 0–1)
BILIRUB SERPL-MCNC: 0.96 MG/DL (ref 0.2–1)
BILIRUB UR QL STRIP: NEGATIVE
BUN SERPL-MCNC: 22 MG/DL (ref 5–25)
CALCIUM SERPL-MCNC: 9.2 MG/DL (ref 8.4–10.2)
CHLORIDE SERPL-SCNC: 103 MMOL/L (ref 96–108)
CHOLEST SERPL-MCNC: 126 MG/DL
CLARITY UR: CLEAR
CO2 SERPL-SCNC: 28 MMOL/L (ref 21–32)
COLOR UR: YELLOW
CREAT SERPL-MCNC: 0.7 MG/DL (ref 0.6–1.3)
EOSINOPHIL # BLD AUTO: 0.34 THOUSAND/ÂΜL (ref 0–0.61)
EOSINOPHIL NFR BLD AUTO: 4 % (ref 0–6)
ERYTHROCYTE [DISTWIDTH] IN BLOOD BY AUTOMATED COUNT: 14.3 % (ref 11.6–15.1)
EST. AVERAGE GLUCOSE BLD GHB EST-MCNC: 163 MG/DL
GFR SERPL CREATININE-BSD FRML MDRD: 99 ML/MIN/1.73SQ M
GLUCOSE P FAST SERPL-MCNC: 125 MG/DL (ref 65–99)
GLUCOSE UR STRIP-MCNC: NEGATIVE MG/DL
HBA1C MFR BLD: 7.3 %
HCT VFR BLD AUTO: 42.7 % (ref 36.5–49.3)
HDLC SERPL-MCNC: 41 MG/DL
HGB BLD-MCNC: 14.2 G/DL (ref 12–17)
HGB UR QL STRIP.AUTO: NEGATIVE
IMM GRANULOCYTES # BLD AUTO: 0.02 THOUSAND/UL (ref 0–0.2)
IMM GRANULOCYTES NFR BLD AUTO: 0 % (ref 0–2)
KETONES UR STRIP-MCNC: NEGATIVE MG/DL
LDLC SERPL CALC-MCNC: 65 MG/DL (ref 0–100)
LEUKOCYTE ESTERASE UR QL STRIP: NEGATIVE
LYMPHOCYTES # BLD AUTO: 3.45 THOUSANDS/ÂΜL (ref 0.6–4.47)
LYMPHOCYTES NFR BLD AUTO: 35 % (ref 14–44)
MCH RBC QN AUTO: 29 PG (ref 26.8–34.3)
MCHC RBC AUTO-ENTMCNC: 33.3 G/DL (ref 31.4–37.4)
MCV RBC AUTO: 87 FL (ref 82–98)
MONOCYTES # BLD AUTO: 0.78 THOUSAND/ÂΜL (ref 0.17–1.22)
MONOCYTES NFR BLD AUTO: 8 % (ref 4–12)
NEUTROPHILS # BLD AUTO: 5.13 THOUSANDS/ÂΜL (ref 1.85–7.62)
NEUTS SEG NFR BLD AUTO: 52 % (ref 43–75)
NITRITE UR QL STRIP: NEGATIVE
NRBC BLD AUTO-RTO: 0 /100 WBCS
PH UR STRIP.AUTO: 6 [PH]
PLATELET # BLD AUTO: 311 THOUSANDS/UL (ref 149–390)
PMV BLD AUTO: 10.4 FL (ref 8.9–12.7)
POTASSIUM SERPL-SCNC: 3.9 MMOL/L (ref 3.5–5.3)
PROT SERPL-MCNC: 7.3 G/DL (ref 6.4–8.4)
PROT UR STRIP-MCNC: NEGATIVE MG/DL
PSA SERPL-MCNC: 0.39 NG/ML (ref 0–4)
RBC # BLD AUTO: 4.9 MILLION/UL (ref 3.88–5.62)
SODIUM SERPL-SCNC: 138 MMOL/L (ref 135–147)
SP GR UR STRIP.AUTO: 1.02 (ref 1–1.03)
TRIGL SERPL-MCNC: 99 MG/DL
TSH SERPL DL<=0.05 MIU/L-ACNC: 5.11 UIU/ML (ref 0.45–4.5)
UROBILINOGEN UR QL STRIP.AUTO: 0.2 E.U./DL
WBC # BLD AUTO: 9.78 THOUSAND/UL (ref 4.31–10.16)

## 2024-04-01 PROCEDURE — 80061 LIPID PANEL: CPT

## 2024-04-01 PROCEDURE — 85025 COMPLETE CBC W/AUTO DIFF WBC: CPT

## 2024-04-01 PROCEDURE — 83036 HEMOGLOBIN GLYCOSYLATED A1C: CPT

## 2024-04-01 PROCEDURE — 84443 ASSAY THYROID STIM HORMONE: CPT

## 2024-04-01 PROCEDURE — 81003 URINALYSIS AUTO W/O SCOPE: CPT

## 2024-04-01 PROCEDURE — 80053 COMPREHEN METABOLIC PANEL: CPT

## 2024-04-01 PROCEDURE — 36415 COLL VENOUS BLD VENIPUNCTURE: CPT

## 2024-04-01 PROCEDURE — G0103 PSA SCREENING: HCPCS

## 2024-04-01 PROCEDURE — 99213 OFFICE O/P EST LOW 20 MIN: CPT | Performed by: FAMILY MEDICINE

## 2024-04-01 RX ORDER — AMOXICILLIN 875 MG/1
TABLET, COATED ORAL
COMMUNITY
Start: 2024-03-19

## 2024-04-01 NOTE — PATIENT INSTRUCTIONS
Medicare Preventive Visit Patient Instructions  Thank you for completing your Welcome to Medicare Visit or Medicare Annual Wellness Visit today. Your next wellness visit will be due in one year (4/2/2025).  The screening/preventive services that you may require over the next 5-10 years are detailed below. Some tests may not apply to you based off risk factors and/or age. Screening tests ordered at today's visit but not completed yet may show as past due. Also, please note that scanned in results may not display below.  Preventive Screenings:  Service Recommendations Previous Testing/Comments   Colorectal Cancer Screening  Colonoscopy    Fecal Occult Blood Test (FOBT)/Fecal Immunochemical Test (FIT)  Fecal DNA/Cologuard Test  Flexible Sigmoidoscopy Age: 45-75 years old   Colonoscopy: every 10 years (May be performed more frequently if at higher risk)  OR  FOBT/FIT: every 1 year  OR  Cologuard: every 3 years  OR  Sigmoidoscopy: every 5 years  Screening may be recommended earlier than age 45 if at higher risk for colorectal cancer. Also, an individualized decision between you and your healthcare provider will decide whether screening between the ages of 76-85 would be appropriate. Colonoscopy: Not on file  FOBT/FIT: Not on file  Cologuard: Not on file  Sigmoidoscopy: Not on file          Prostate Cancer Screening Individualized decision between patient and health care provider in men between ages of 55-69   Medicare will cover every 12 months beginning on the day after your 50th birthday PSA: No results in last 5 years           Hepatitis C Screening Once for adults born between 1945 and 1965  More frequently in patients at high risk for Hepatitis C Hep C Antibody: Not on file        Diabetes Screening 1-2 times per year if you're at risk for diabetes or have pre-diabetes Fasting glucose: 140 mg/dL (5/2/2023)  A1C: No results in last 5 years (No results in last 5 years)  Screening Current   Cholesterol Screening Once  every 5 years if you don't have a lipid disorder. May order more often based on risk factors. Lipid panel: Not on file  Screening Not Indicated  History Lipid Disorder      Other Preventive Screenings Covered by Medicare:  Abdominal Aortic Aneurysm (AAA) Screening: covered once if your at risk. You're considered to be at risk if you have a family history of AAA or a male between the age of 65-75 who smoking at least 100 cigarettes in your lifetime.  Lung Cancer Screening: covers low dose CT scan once per year if you meet all of the following conditions: (1) Age 55-77; (2) No signs or symptoms of lung cancer; (3) Current smoker or have quit smoking within the last 15 years; (4) You have a tobacco smoking history of at least 20 pack years (packs per day x number of years you smoked); (5) You get a written order from a healthcare provider.  Glaucoma Screening: covered annually if you're considered high risk: (1) You have diabetes OR (2) Family history of glaucoma OR (3)  aged 50 and older OR (4)  American aged 65 and older  Osteoporosis Screening: covered every 2 years if you meet one of the following conditions: (1) Have a vertebral abnormality; (2) On glucocorticoid therapy for more than 3 months; (3) Have primary hyperparathyroidism; (4) On osteoporosis medications and need to assess response to drug therapy.  HIV Screening: covered annually if you're between the age of 15-65. Also covered annually if you are younger than 15 and older than 65 with risk factors for HIV infection. For pregnant patients, it is covered up to 3 times per pregnancy.    Immunizations:  Immunization Recommendations   Influenza Vaccine Annual influenza vaccination during flu season is recommended for all persons aged >= 6 months who do not have contraindications   Pneumococcal Vaccine   * Pneumococcal conjugate vaccine = PCV13 (Prevnar 13), PCV15 (Vaxneuvance), PCV20 (Prevnar 20)  * Pneumococcal polysaccharide vaccine  = PPSV23 (Pneumovax) Adults 19-63 yo with certain risk factors or if 65+ yo  If never received any pneumonia vaccine: recommend Prevnar 20 (PCV20)  Give PCV20 if previously received 1 dose of PCV13 or PPSV23   Hepatitis B Vaccine 3 dose series if at intermediate or high risk (ex: diabetes, end stage renal disease, liver disease)   Respiratory syncytial virus (RSV) Vaccine - COVERED BY MEDICARE PART D  * RSVPreF3 (Arexvy) CDC recommends that adults 60 years of age and older may receive a single dose of RSV vaccine using shared clinical decision-making (SCDM)   Tetanus (Td) Vaccine - COST NOT COVERED BY MEDICARE PART B Following completion of primary series, a booster dose should be given every 10 years to maintain immunity against tetanus. Td may also be given as tetanus wound prophylaxis.   Tdap Vaccine - COST NOT COVERED BY MEDICARE PART B Recommended at least once for all adults. For pregnant patients, recommended with each pregnancy.   Shingles Vaccine (Shingrix) - COST NOT COVERED BY MEDICARE PART B  2 shot series recommended in those 19 years and older who have or will have weakened immune systems or those 50 years and older     Health Maintenance Due:      Topic Date Due   • Hepatitis C Screening  Never done   • HIV Screening  Never done   • Colorectal Cancer Screening  Never done     Immunizations Due:      Topic Date Due   • COVID-19 Vaccine (7 - 2023-24 season) 11/27/2023     Advance Directives   What are advance directives?  Advance directives are legal documents that state your wishes and plans for medical care. These plans are made ahead of time in case you lose your ability to make decisions for yourself. Advance directives can apply to any medical decision, such as the treatments you want, and if you want to donate organs.   What are the types of advance directives?  There are many types of advance directives, and each state has rules about how to use them. You may choose a combination of any of the  following:  Living will:  This is a written record of the treatment you want. You can also choose which treatments you do not want, which to limit, and which to stop at a certain time. This includes surgery, medicine, IV fluid, and tube feedings.   Durable power of  for healthcare (DPAHC):  This is a written record that states who you want to make healthcare choices for you when you are unable to make them for yourself. This person, called a proxy, is usually a family member or a friend. You may choose more than 1 proxy.  Do not resuscitate (DNR) order:  A DNR order is used in case your heart stops beating or you stop breathing. It is a request not to have certain forms of treatment, such as CPR. A DNR order may be included in other types of advance directives.  Medical directive:  This covers the care that you want if you are in a coma, near death, or unable to make decisions for yourself. You can list the treatments you want for each condition. Treatment may include pain medicine, surgery, blood transfusions, dialysis, IV or tube feedings, and a ventilator (breathing machine).  Values history:  This document has questions about your views, beliefs, and how you feel and think about life. This information can help others choose the care that you would choose.  Why are advance directives important?  An advance directive helps you control your care. Although spoken wishes may be used, it is better to have your wishes written down. Spoken wishes can be misunderstood, or not followed. Treatments may be given even if you do not want them. An advance directive may make it easier for your family to make difficult choices about your care.   Weight Management   Why it is important to manage your weight:  Being overweight increases your risk of health conditions such as heart disease, high blood pressure, type 2 diabetes, and certain types of cancer. It can also increase your risk for osteoarthritis, sleep apnea, and  other respiratory problems. Aim for a slow, steady weight loss. Even a small amount of weight loss can lower your risk of health problems.  How to lose weight safely:  A safe and healthy way to lose weight is to eat fewer calories and get regular exercise. You can lose up about 1 pound a week by decreasing the number of calories you eat by 500 calories each day.   Healthy meal plan for weight management:  A healthy meal plan includes a variety of foods, contains fewer calories, and helps you stay healthy. A healthy meal plan includes the following:  Eat whole-grain foods more often.  A healthy meal plan should contain fiber. Fiber is the part of grains, fruits, and vegetables that is not broken down by your body. Whole-grain foods are healthy and provide extra fiber in your diet. Some examples of whole-grain foods are whole-wheat breads and pastas, oatmeal, brown rice, and bulgur.  Eat a variety of vegetables every day.  Include dark, leafy greens such as spinach, kale, alfred greens, and mustard greens. Eat yellow and orange vegetables such as carrots, sweet potatoes, and winter squash.   Eat a variety of fruits every day.  Choose fresh or canned fruit (canned in its own juice or light syrup) instead of juice. Fruit juice has very little or no fiber.  Eat low-fat dairy foods.  Drink fat-free (skim) milk or 1% milk. Eat fat-free yogurt and low-fat cottage cheese. Try low-fat cheeses such as mozzarella and other reduced-fat cheeses.  Choose meat and other protein foods that are low in fat.  Choose beans or other legumes such as split peas or lentils. Choose fish, skinless poultry (chicken or turkey), or lean cuts of red meat (beef or pork). Before you cook meat or poultry, cut off any visible fat.   Use less fat and oil.  Try baking foods instead of frying them. Add less fat, such as margarine, sour cream, regular salad dressing and mayonnaise to foods. Eat fewer high-fat foods. Some examples of high-fat foods  include french fries, doughnuts, ice cream, and cakes.  Eat fewer sweets.  Limit foods and drinks that are high in sugar. This includes candy, cookies, regular soda, and sweetened drinks.  Exercise:  Exercise at least 30 minutes per day on most days of the week. Some examples of exercise include walking, biking, dancing, and swimming. You can also fit in more physical activity by taking the stairs instead of the elevator or parking farther away from stores. Ask your healthcare provider about the best exercise plan for you.      © Copyright "[employer] 2018 Information is for End User's use only and may not be sold, redistributed or otherwise used for commercial purposes. All illustrations and images included in CareNotes® are the copyrighted property of [employer] or [employer]

## 2024-04-01 NOTE — PROGRESS NOTES
Assessment and Plan:     Problem List Items Addressed This Visit       Multiple sclerosis (HCC) - Primary    Relevant Orders    Comprehensive metabolic panel    CBC and differential    TSH, 3rd generation    Essential hypertension    Relevant Orders    TSH, 3rd generation    UA w Reflex to Microscopic w Reflex to Culture    CAD (coronary artery disease)    Relevant Orders    TSH, 3rd generation    UA w Reflex to Microscopic w Reflex to Culture    Hyperlipidemia LDL goal <70    Relevant Orders    Lipid Panel with Direct LDL reflex     Other Visit Diagnoses       IFG (impaired fasting glucose)        Stressed the importance of weight loss strategies dietary modifications obtain A1c with next lab    Relevant Orders    Hemoglobin A1C    Screening for prostate cancer        Relevant Orders    PSA, Total Screen    Medicare annual wellness visit, subsequent        Medicare annual wellness visit, initial                Depression Screening and Follow-up Plan: Patient was screened for depression during today's encounter. They screened negative with a PHQ-2 score of 0.      Preventive health issues were discussed with patient, and age appropriate screening tests were ordered as noted in patient's After Visit Summary.  Personalized health advice and appropriate referrals for health education or preventive services given if needed, as noted in patient's After Visit Summary.     History of Present Illness:     Patient presents for a Medicare Wellness Visit    F/u   Joshua najera MS , has scheduled with neuro nathalie hoskins ,   Planning on [place] walk 3+ miles        Patient Care Team:  OSKAR Swan as PCP - General (Family Medicine)  OSKAR Swan (Family Medicine)     Review of Systems:     Review of Systems   Constitutional:  Negative for appetite change, chills, fever and unexpected weight change.   HENT:  Negative for congestion, dental problem, ear pain, hearing loss, postnasal drip, rhinorrhea, sinus pressure,  sinus pain, sneezing, sore throat, tinnitus and voice change.    Eyes:  Negative for visual disturbance.   Respiratory:  Negative for apnea, cough, chest tightness and shortness of breath.    Cardiovascular:  Negative for chest pain, palpitations and leg swelling.   Gastrointestinal:  Negative for abdominal pain, blood in stool, constipation, diarrhea, nausea and vomiting.   Endocrine: Negative for cold intolerance, heat intolerance, polydipsia, polyphagia and polyuria.   Genitourinary:  Negative for decreased urine volume, difficulty urinating, dysuria, frequency and hematuria.   Musculoskeletal:  Negative for arthralgias, back pain, gait problem, joint swelling and myalgias.   Skin:  Negative for color change, rash and wound.   Allergic/Immunologic: Negative for environmental allergies and food allergies.   Neurological:  Negative for dizziness, syncope, weakness, light-headedness, numbness and headaches.   Hematological:  Negative for adenopathy. Does not bruise/bleed easily.   Psychiatric/Behavioral:  Negative for sleep disturbance and suicidal ideas. The patient is not nervous/anxious.         Problem List:     Patient Active Problem List   Diagnosis    Multiple sclerosis (HCC)    Morbid obesity with BMI of 40.0-44.9, adult ([facility])    Contusion of right knee    Viral syndrome    Essential hypertension    CAD (coronary artery disease)    Hyperlipidemia LDL goal <70    Subacute cough      Past Medical and Surgical History:     Past Medical History:   Diagnosis Date    Arthritis 2015    In my hands, rt shoulder    Cancer (HCC) 2010    [place] twice in nose    Diabetes mellitus (HCC)     Borderline    Diverticulitis of colon 2021    Colon oscapy they found it    GERD (gastroesophageal reflux disease) 2023    [facility] did throat test, said i have    Heart murmur 1959    Told i was born with it. Had checked not pronounced    High blood pressure     2021    HL (hearing loss) 2016    From work    Hypertension 2020    Kidney  stone     Had stones twice    Memory loss     From ms short term loss    MS (multiple sclerosis) (HCC)         Urinary tract infection     Had infection last year     Past Surgical History:   Procedure Laterality Date    BUNIONECTOMY Right     2002    CYST REMOVAL          FL MYELOGRAM LUMBAR  2020    NOSTRIL RETAINER INSERTION Bilateral           Family History:     Family History   Problem Relation Age of Onset    Cancer Father          from clear cell carcinoma in kidney’s    Prostate cancer Father     Hypertension Father     Nephrolithiasis Father     Diabetes Mother     Hypertension Mother     Cancer Paternal Uncle         Kidney cancer    Cancer Brother         clear cell carcinma    Hypertension Sister     Diabetes Sister     Hyperlipidemia Sister     Hypertension Sister     Diabetes Sister     Hyperlipidemia Sister     No Known Problems Daughter     Bipolar disorder Sister     OCD Sister       Social History:     Social History     Socioeconomic History    Marital status: /Civil Union     Spouse name: None    Number of children: None    Years of education: None    Highest education level: None   Occupational History    None   Tobacco Use    Smoking status: Never     Passive exposure: Past    Smokeless tobacco: Never    Tobacco comments:     Don’t smoke   Vaping Use    Vaping status: Never Used   Substance and Sexual Activity    Alcohol use: Never    Drug use: Never    Sexual activity: Yes     Partners: Female     Comment: Hysterectomy   Other Topics Concern    None   Social History Narrative    None     Social Determinants of Health     Financial Resource Strain: Not on file   Food Insecurity: No Food Insecurity (2024)    Hunger Vital Sign     Worried About Running Out of Food in the Last Year: Never true     Ran Out of Food in the Last Year: Never true   Transportation Needs: No Transportation Needs (2024)    PRAPARE - Transportation     Lack of Transportation  (Medical): No     Lack of Transportation (Non-Medical): No   Physical Activity: Not on file   Stress: Not on file   Social Connections: Not on file   Intimate Partner Violence: Not on file   Housing Stability: Low Risk  (4/1/2024)    Housing Stability Vital Sign     Unable to Pay for Housing in the Last Year: No     Number of Places Lived in the Last Year: 1     Unstable Housing in the Last Year: No      Medications and Allergies:     Current Outpatient Medications   Medication Sig Dispense Refill    amoxicillin (AMOXIL) 875 mg tablet TAKE 2 TO START, THEN TAKE 1 TABLET TWICE DAILY FOR 5 DAYS      aspirin 81 mg chewable tablet Chew 81 mg      atorvastatin (LIPITOR) 40 mg tablet Take 1 tablet (40 mg total) by mouth daily 90 tablet 3    Cholecalciferol 125 MCG/ML LIQD Take 5,000 Units by mouth      Ciclopirox 0.77 % gel       EPINEPHrine (EPIPEN) 0.3 mg/0.3 mL SOAJ       ergocalciferol (VITAMIN D2) 50,000 units Take 50,000 Units by mouth      Glatiramer Acetate 40 MG/ML SOSY INJECT ONE SYRINGE SUBCUTANEOUSLY 3 TIMES A WEEK AT LEAST 48 HOURS APART. ALLOW TO WARM TO ROOM TEMP FOR 20 MINUTES. REFRIGERATE.      glucosamine 500 MG CAPS capsule Take 1,000 mg by mouth daily      ketoconazole (NIZORAL) 2 % shampoo Apply topically      losartan-hydrochlorothiazide (HYZAAR) 50-12.5 mg per tablet Take 1 tablet by mouth daily 90 tablet 3    tacrolimus (PROTOPIC) 0.1 % ointment APPLY TOPICALLY TWICE DAILY FOR 14 DAYS 100 g 0    Tart Cherry 1200 MG CAPS Take by mouth      Turmeric 1053 MG TABS Take by mouth       No current facility-administered medications for this visit.     Allergies   Allergen Reactions    Mometasone Other (See Comments)     Throat Irritation      Immunizations:     Immunization History   Administered Date(s) Administered    COVID-19 PFIZER VACCINE 0.3 ML IM 03/08/2021, 03/29/2021, 10/29/2021    COVID-19 Pfizer Vac BIVALENT Deon-sucrose 12 Yr+ IM 09/27/2022    COVID-19 Pfizer mRNA vacc PF deon-sucrose 12 yr and  older (Comirnaty) 10/02/2023    COVID-19 Pfizer vac (Deon-sucrose, gray cap) 12 yr+ IM 06/30/2022    INFLUENZA 11/03/2021, 10/01/2022, 09/03/2023    Pneumococcal Conjugate Vaccine 20-valent (Pcv20), Polysace 12/01/2022    Tdap 10/27/2023    Zoster Vaccine Recombinant 10/01/2022, 12/01/2022      Health Maintenance:         Topic Date Due    Hepatitis C Screening  Never done    HIV Screening  Never done    Colorectal Cancer Screening  Never done         Topic Date Due    COVID-19 Vaccine (7 - 2023-24 season) 11/27/2023      Medicare Screening Tests and Risk Assessments:         Health Risk Assessment:   Patient rates overall health as good. Patient feels that their physical health rating is slightly worse. Patient is satisfied with their life. Eyesight was rated as same. Hearing was rated as slightly worse. Patient feels that their emotional and mental health rating is same. Patients states they are never, rarely angry. Patient states they are often unusually tired/fatigued. Pain experienced in the last 7 days has been some. Patient's pain rating has been 3/10. Patient states that he has experienced weight loss or gain in last 6 months.     Depression Screening:   PHQ-2 Score: 0      Fall Risk Screening:   In the past year, patient has experienced: history of falling in past year      Home Safety:  Patient has trouble with stairs inside or outside of their home. Patient has working smoke alarms and has working carbon monoxide detector. Home safety hazards include: none. Additional bannisters placed in home     Nutrition:   Current diet is Regular.     Medications:   Patient is currently taking over-the-counter supplements. OTC medications include: see medication list. Patient is able to manage medications.     Activities of Daily Living (ADLs)/Instrumental Activities of Daily Living (IADLs):   Walk and transfer into and out of bed and chair?: Yes  Dress and groom yourself?: Yes    Bathe or shower yourself?: Yes     Feed yourself? Yes  Do your laundry/housekeeping?: Yes  Manage your money, pay your bills and track your expenses?: Yes  Make your own meals?: Yes    Do your own shopping?: Yes    Previous Hospitalizations:   Any hospitalizations or ED visits within the last 12 months?: Yes    How many hospitalizations have you had in the last year?: 1-2    Advance Care Planning:   Living will: Yes    Durable POA for healthcare: Yes    Advanced directive: Yes      Cognitive Screening:   Provider or family/friend/caregiver concerned regarding cognition?: No    PREVENTIVE SCREENINGS      Cardiovascular Screening:    General: Screening Not Indicated and History Lipid Disorder      Diabetes Screening:     General: Screening Current      Prostate Cancer Screening:    General: Risks and Benefits Discussed      Osteoporosis Screening:    General: Screening Not Indicated      Abdominal Aortic Aneurysm (AAA) Screening:        General: Screening Not Indicated      Lung Cancer Screening:     General: Screening Not Indicated      Hepatitis C Screening:    General: Screening Not Indicated    Screening, Brief Intervention, and Referral to Treatment (SBIRT)    Screening  Typical number of drinks in a day: 0  Typical number of drinks in a week: 0  Interpretation: Low risk drinking behavior.    AUDIT-C Screenin) How often did you have a drink containing alcohol in the past year? never  2) How many drinks did you have on a typical day when you were drinking in the past year? 0  3) How often did you have 6 or more drinks on one occasion in the past year? never    AUDIT-C Score: 0  Interpretation: Score 0-3 (male): Negative screen for alcohol misuse    Single Item Drug Screening:  How often have you used an illegal drug (including marijuana) or a prescription medication for non-medical reasons in the past year? never    Single Item Drug Screen Score: 0  Interpretation: Negative screen for possible drug use disorder    No results found.      "Physical Exam:     /86 (BP Location: Left arm, Patient Position: Sitting)   Pulse 88   Temp (!) 97.4 °F (36.3 °C)   Ht 5' 8\" (1.727 m)   Wt 127 kg (278 lb 14.4 oz)   SpO2 97%   BMI 42.41 kg/m²     Physical Exam  Vitals and nursing note reviewed.   Constitutional:       General: He is not in acute distress.     Appearance: He is well-developed. He is obese. He is not ill-appearing.   HENT:      Head: Normocephalic and atraumatic.   Eyes:      Conjunctiva/sclera: Conjunctivae normal.   Cardiovascular:      Rate and Rhythm: Normal rate and regular rhythm.      Heart sounds: No murmur heard.  Pulmonary:      Effort: Pulmonary effort is normal. No respiratory distress.      Breath sounds: Normal breath sounds.   Abdominal:      Palpations: Abdomen is soft.      Tenderness: There is no abdominal tenderness.   Musculoskeletal:         General: No swelling.      Cervical back: Neck supple.   Skin:     General: Skin is warm and dry.      Capillary Refill: Capillary refill takes less than 2 seconds.   Neurological:      Mental Status: He is alert.   Psychiatric:         Mood and Affect: Mood normal.          OSKAR wSan  "

## 2024-04-02 DIAGNOSIS — R79.89 ELEVATED TSH: ICD-10-CM

## 2024-04-02 DIAGNOSIS — R73.01 IFG (IMPAIRED FASTING GLUCOSE): Primary | ICD-10-CM

## 2024-04-02 RX ORDER — METFORMIN HYDROCHLORIDE 500 MG/1
500 TABLET, EXTENDED RELEASE ORAL
Qty: 180 TABLET | Refills: 3 | Status: SHIPPED | OUTPATIENT
Start: 2024-04-02

## 2024-04-30 ENCOUNTER — TELEPHONE (OUTPATIENT)
Age: 64
End: 2024-04-30

## 2024-04-30 PROBLEM — R05.2 SUBACUTE COUGH: Status: RESOLVED | Noted: 2024-03-01 | Resolved: 2024-04-30

## 2024-04-30 NOTE — TELEPHONE ENCOUNTER
Pt called in with questions regarding his diabetes and metformin. Diabetes education provided. Signs and symptoms of hypoglcemia reviewed with pt, who denied having any of those symptoms since starting metformin. Denies any adverse effects from metformin. Pt reports eating healthier diet and walking over a mile a day. Pt wondering about blood sugar testing, he had previously sent a [employer] message asking. 3 month f/u appt scheduled. Please advise.

## 2024-05-02 DIAGNOSIS — R73.01 IFG (IMPAIRED FASTING GLUCOSE): ICD-10-CM

## 2024-05-02 DIAGNOSIS — R73.01 IFG (IMPAIRED FASTING GLUCOSE): Primary | ICD-10-CM

## 2024-05-02 RX ORDER — BLOOD-GLUCOSE METER
KIT MISCELLANEOUS
Qty: 1 KIT | Refills: 0 | Status: SHIPPED | OUTPATIENT
Start: 2024-05-02

## 2024-05-02 RX ORDER — LANCETS 33 GAUGE
EACH MISCELLANEOUS
Qty: 100 EACH | Refills: 3 | Status: SHIPPED | OUTPATIENT
Start: 2024-05-02 | End: 2024-05-03 | Stop reason: SDUPTHER

## 2024-05-02 RX ORDER — BLOOD SUGAR DIAGNOSTIC
STRIP MISCELLANEOUS
Qty: 100 EACH | Refills: 3 | Status: SHIPPED | OUTPATIENT
Start: 2024-05-02 | End: 2024-05-03 | Stop reason: SDUPTHER

## 2024-05-03 ENCOUNTER — TELEPHONE (OUTPATIENT)
Dept: FAMILY MEDICINE CLINIC | Facility: CLINIC | Age: 64
End: 2024-05-03

## 2024-05-03 DIAGNOSIS — R73.01 IFG (IMPAIRED FASTING GLUCOSE): ICD-10-CM

## 2024-05-03 RX ORDER — BLOOD SUGAR DIAGNOSTIC
STRIP MISCELLANEOUS
Qty: 100 EACH | Refills: 3 | Status: SHIPPED | OUTPATIENT
Start: 2024-05-03

## 2024-05-03 RX ORDER — LANCETS 33 GAUGE
EACH MISCELLANEOUS
Qty: 100 EACH | Refills: 3 | Status: SHIPPED | OUTPATIENT
Start: 2024-05-03

## 2024-05-03 NOTE — TELEPHONE ENCOUNTER
Patient of OSKAR Sawn.  Patient went to pharmacy to  glucometer and supplies.  Pharmacy states only script for glucometer.  Please resend scripts for test strips and lancets.  Questions or concerns: [phone]

## 2024-05-06 RX ORDER — BLOOD SUGAR DIAGNOSTIC
STRIP MISCELLANEOUS
Qty: 100 STRIP | Refills: 3 | Status: SHIPPED | OUTPATIENT
Start: 2024-05-06

## 2024-06-24 ENCOUNTER — RA CDI HCC (OUTPATIENT)
Dept: OTHER | Facility: HOSPITAL | Age: 64
End: 2024-06-24

## 2024-07-01 ENCOUNTER — OFFICE VISIT (OUTPATIENT)
Dept: FAMILY MEDICINE CLINIC | Facility: CLINIC | Age: 64
End: 2024-07-01
Payer: MEDICARE

## 2024-07-01 VITALS
DIASTOLIC BLOOD PRESSURE: 80 MMHG | SYSTOLIC BLOOD PRESSURE: 122 MMHG | HEIGHT: 68 IN | OXYGEN SATURATION: 98 % | WEIGHT: 258.6 LBS | RESPIRATION RATE: 14 BRPM | BODY MASS INDEX: 39.19 KG/M2 | HEART RATE: 78 BPM

## 2024-07-01 DIAGNOSIS — Z12.11 SCREENING FOR COLON CANCER: ICD-10-CM

## 2024-07-01 DIAGNOSIS — R73.01 IFG (IMPAIRED FASTING GLUCOSE): ICD-10-CM

## 2024-07-01 DIAGNOSIS — E78.5 HYPERLIPIDEMIA LDL GOAL <70: ICD-10-CM

## 2024-07-01 DIAGNOSIS — G35 MULTIPLE SCLEROSIS (HCC): ICD-10-CM

## 2024-07-01 DIAGNOSIS — I10 ESSENTIAL HYPERTENSION: Primary | ICD-10-CM

## 2024-07-01 DIAGNOSIS — E66.01 MORBID OBESITY WITH BMI OF 40.0-44.9, ADULT (HCC): ICD-10-CM

## 2024-07-01 PROBLEM — I25.10 CAD (CORONARY ARTERY DISEASE): Status: RESOLVED | Noted: 2021-08-30 | Resolved: 2024-07-01

## 2024-07-01 PROCEDURE — 99214 OFFICE O/P EST MOD 30 MIN: CPT | Performed by: FAMILY MEDICINE

## 2024-07-01 PROCEDURE — G2211 COMPLEX E/M VISIT ADD ON: HCPCS | Performed by: FAMILY MEDICINE

## 2024-07-01 RX ORDER — ATORVASTATIN CALCIUM 40 MG/1
40 TABLET, FILM COATED ORAL DAILY
Qty: 90 TABLET | Refills: 3 | Status: SHIPPED | OUTPATIENT
Start: 2024-07-01

## 2024-07-01 RX ORDER — LOSARTAN POTASSIUM AND HYDROCHLOROTHIAZIDE 12.5; 5 MG/1; MG/1
1 TABLET ORAL DAILY
Qty: 90 TABLET | Refills: 3 | Status: SHIPPED | OUTPATIENT
Start: 2024-07-01

## 2024-07-01 RX ORDER — METFORMIN HYDROCHLORIDE 500 MG/1
1000 TABLET, EXTENDED RELEASE ORAL
Qty: 180 TABLET | Refills: 3 | Status: SHIPPED | OUTPATIENT
Start: 2024-07-01 | End: 2024-07-01 | Stop reason: SDUPTHER

## 2024-07-01 RX ORDER — METFORMIN HYDROCHLORIDE 500 MG/1
1000 TABLET, EXTENDED RELEASE ORAL
Qty: 180 TABLET | Refills: 3 | Status: SHIPPED | OUTPATIENT
Start: 2024-07-01

## 2024-07-01 NOTE — ASSESSMENT & PLAN NOTE
Stable tolerating metformin, increase to 2 tabs evening meal, encourage continued weight loss strategies  Dietary modification  Discussed reviewed diabetic ranges/care

## 2024-07-01 NOTE — PROGRESS NOTES
Assessment/Plan:     Chronic Problems:  Essential hypertension  Stable, continue current medications encourage continued weight loss strategies, low-salt sodium diet regular routine exercise program    Multiple sclerosis (HCC)  Stable, continue to follow-up with neurology [facility]    Hyperlipidemia LDL goal <70  Stable, tolerating statin to be continued    IFG (impaired fasting glucose)  Stable tolerating metformin, increase to 2 tabs evening meal, encourage continued weight loss strategies  Dietary modification  Discussed reviewed diabetic ranges/care      Visit Diagnosis:  Diagnoses and all orders for this visit:    Essential hypertension  -     losartan-hydrochlorothiazide (HYZAAR) 50-12.5 mg per tablet; Take 1 tablet by mouth daily    Screening for colon cancer  -     Cologuard    Multiple sclerosis (HCC)    Hyperlipidemia LDL goal <70  -     atorvastatin (LIPITOR) 40 mg tablet; Take 1 tablet (40 mg total) by mouth daily    Morbid obesity with BMI of 40.0-44.9, adult ([facility])    IFG (impaired fasting glucose)  -     Discontinue: metFORMIN (GLUCOPHAGE-XR) 500 mg 24 hr tablet; Take 2 tablets (1,000 mg total) by mouth daily with dinner  -     metFORMIN (GLUCOPHAGE-XR) 500 mg 24 hr tablet; Take 2 tablets (1,000 mg total) by mouth daily with dinner          Subjective:    Patient ID: Jesús Sainz is a 63 y.o. male.    HPI    The following portions of the patient's history were reviewed and updated as appropriate: allergies, current medications, past family history, past medical history, past social history, past surgical history and problem list.    Review of Systems   Constitutional:  Negative for appetite change, chills, fever and unexpected weight change.   HENT:  Negative for congestion, dental problem, ear pain, hearing loss, postnasal drip, rhinorrhea, sinus pressure, sinus pain, sneezing, sore throat, tinnitus and voice change.    Eyes:  Negative for visual disturbance.   Respiratory:  Negative for apnea, cough,  "chest tightness and shortness of breath.    Cardiovascular:  Negative for chest pain, palpitations and leg swelling.   Gastrointestinal:  Negative for abdominal pain, blood in stool, constipation, diarrhea, nausea and vomiting.   Endocrine: Negative for cold intolerance, heat intolerance, polydipsia, polyphagia and polyuria.   Genitourinary:  Negative for decreased urine volume, difficulty urinating, dysuria, frequency and hematuria.   Musculoskeletal:  Negative for arthralgias, back pain, gait problem, joint swelling and myalgias.   Skin:  Negative for color change, rash and wound.   Allergic/Immunologic: Negative for environmental allergies and food allergies.   Neurological:  Negative for dizziness, syncope, weakness, light-headedness, numbness and headaches.   Hematological:  Negative for adenopathy. Does not bruise/bleed easily.   Psychiatric/Behavioral:  Negative for sleep disturbance and suicidal ideas. The patient is not nervous/anxious.          /80   Pulse 78   Resp 14   Ht 5' 8\" (1.727 m)   Wt 117 kg (258 lb 9.6 oz)   SpO2 98%   BMI 39.32 kg/m²   Social History     Socioeconomic History    Marital status: /Civil Union     Spouse name: Not on file    Number of children: Not on file    Years of education: Not on file    Highest education level: Not on file   Occupational History    Not on file   Tobacco Use    Smoking status: Never     Passive exposure: Past    Smokeless tobacco: Never    Tobacco comments:     Don’t smoke   Vaping Use    Vaping status: Never Used   Substance and Sexual Activity    Alcohol use: Never    Drug use: Never    Sexual activity: Yes     Partners: Female     Comment: Hysterectomy   Other Topics Concern    Not on file   Social History Narrative    Not on file     Social Determinants of Health     Financial Resource Strain: Not on file   Food Insecurity: No Food Insecurity (4/1/2024)    Hunger Vital Sign     Worried About Running Out of Food in the Last Year: Never " true     Ran Out of Food in the Last Year: Never true   Transportation Needs: No Transportation Needs (2024)    PRAPARE - Transportation     Lack of Transportation (Medical): No     Lack of Transportation (Non-Medical): No   Physical Activity: Not on file   Stress: Not on file   Social Connections: Not on file   Intimate Partner Violence: Not on file   Housing Stability: Low Risk  (2024)    Housing Stability Vital Sign     Unable to Pay for Housing in the Last Year: No     Number of Times Moved in the Last Year: 1     Homeless in the Last Year: No     Past Medical History:   Diagnosis Date    Arthritis 2015    In my hands, rt shoulder    Cancer (HCC) 2010    Basil twice in nose    Diabetes mellitus (HCC)     Borderline    Diverticulitis of colon     Colon oscapy they found it    GERD (gastroesophageal reflux disease)     [facility] did throat test, said i have    Heart murmur     Told i was born with it. Had checked not pronounced    High blood pressure         HL (hearing loss) 2016    From work    Hypertension 2020    Kidney stone     Had stones twice    Memory loss     From ms short term loss    MS (multiple sclerosis) ([facility])         Urinary tract infection     Had infection last year     Family History   Problem Relation Age of Onset    Cancer Father          from clear cell carcinoma in kidney’s    Prostate cancer Father     Hypertension Father     Nephrolithiasis Father     Diabetes Mother     Hypertension Mother     Cancer Paternal Uncle         Kidney cancer    Cancer Brother         clear cell carcinma    Hypertension Sister     Diabetes Sister     Hyperlipidemia Sister     Hypertension Sister     Diabetes Sister     Hyperlipidemia Sister     No Known Problems Daughter     Bipolar disorder Sister     OCD Sister      Past Surgical History:   Procedure Laterality Date    BUNIONECTOMY Right     2002    CYST REMOVAL          FL MYELOGRAM LUMBAR  2020    NOSTRIL RETAINER  INSERTION Bilateral     1990's       Current Outpatient Medications:     aspirin 81 mg chewable tablet, Chew 81 mg, Disp: , Rfl:     atorvastatin (LIPITOR) 40 mg tablet, Take 1 tablet (40 mg total) by mouth daily, Disp: 90 tablet, Rfl: 3    Blood Glucose Monitoring Suppl (OneTouch Verio Reflect) w/Device KIT, Check blood sugars once daily. Please substitute with appropriate alternative as covered by patient's insurance. Dx: E11.65, Disp: 1 kit, Rfl: 0    Ciclopirox 0.77 % gel, , Disp: , Rfl:     EPINEPHrine (EPIPEN) 0.3 mg/0.3 mL SOAJ, , Disp: , Rfl:     ergocalciferol (VITAMIN D2) 50,000 units, Take 50,000 Units by mouth, Disp: , Rfl:     Glatiramer Acetate 40 MG/ML SOSY, INJECT ONE SYRINGE SUBCUTANEOUSLY 3 TIMES A WEEK AT LEAST 48 HOURS APART. ALLOW TO WARM TO ROOM TEMP FOR 20 MINUTES. REFRIGERATE., Disp: , Rfl:     glucosamine 500 MG CAPS capsule, Take 1,000 mg by mouth daily, Disp: , Rfl:     glucose blood (OneTouch Verio) test strip, CHECK BLOOD SUGARS ONCE DAILY. (DX: E11.65), Disp: 100 strip, Rfl: 3    glucose blood (OneTouch Verio) test strip, Check blood sugars once daily. Please substitute with appropriate alternative as covered by patient's insurance. Dx: E11.65, Disp: 100 each, Rfl: 3    ketoconazole (NIZORAL) 2 % shampoo, Apply topically, Disp: , Rfl:     losartan-hydrochlorothiazide (HYZAAR) 50-12.5 mg per tablet, Take 1 tablet by mouth daily, Disp: 90 tablet, Rfl: 3    metFORMIN (GLUCOPHAGE-XR) 500 mg 24 hr tablet, Take 2 tablets (1,000 mg total) by mouth daily with dinner, Disp: 180 tablet, Rfl: 3    OneTouch Delica Lancets 33G MISC, Check blood sugars once daily. Please substitute with appropriate alternative as covered by patient's insurance. Dx: E11.65, Disp: 100 each, Rfl: 3    Tart Cherry 1200 MG CAPS, Take by mouth, Disp: , Rfl:     Turmeric 1053 MG TABS, Take by mouth, Disp: , Rfl:     Allergies   Allergen Reactions    Mometasone Other (See Comments)     Throat Irritation          Lab Review    Appointment on 04/01/2024   Component Date Value    PSA 04/01/2024 0.39     Sodium 04/01/2024 138     Potassium 04/01/2024 3.9     Chloride 04/01/2024 103     CO2 04/01/2024 28     ANION GAP 04/01/2024 7     BUN 04/01/2024 22     Creatinine 04/01/2024 0.70     Glucose, Fasting 04/01/2024 125 (H)     Calcium 04/01/2024 9.2     AST 04/01/2024 13     ALT 04/01/2024 19     Alkaline Phosphatase 04/01/2024 48     Total Protein 04/01/2024 7.3     Albumin 04/01/2024 4.3     Total Bilirubin 04/01/2024 0.96     eGFR 04/01/2024 99     WBC 04/01/2024 9.78     RBC 04/01/2024 4.90     Hemoglobin 04/01/2024 14.2     Hematocrit 04/01/2024 42.7     MCV 04/01/2024 87     MCH 04/01/2024 29.0     MCHC 04/01/2024 33.3     RDW 04/01/2024 14.3     MPV 04/01/2024 10.4     Platelets 04/01/2024 311     nRBC 04/01/2024 0     Segmented % 04/01/2024 52     Immature Grans % 04/01/2024 0     Lymphocytes % 04/01/2024 35     Monocytes % 04/01/2024 8     Eosinophils Relative 04/01/2024 4     Basophils Relative 04/01/2024 1     Absolute Neutrophils 04/01/2024 5.13     Absolute Immature Grans 04/01/2024 0.02     Absolute Lymphocytes 04/01/2024 3.45     Absolute Monocytes 04/01/2024 0.78     Eosinophils Absolute 04/01/2024 0.34     Basophils Absolute 04/01/2024 0.06     Cholesterol 04/01/2024 126     Triglycerides 04/01/2024 99     HDL, Direct 04/01/2024 41     LDL Calculated 04/01/2024 65     TSH 3RD GENERATON 04/01/2024 5.110 (H)     Color, UA 04/01/2024 Yellow     Clarity, UA 04/01/2024 Clear     Specific Gravity, UA 04/01/2024 1.025     pH, UA 04/01/2024 6.0     Leukocytes, UA 04/01/2024 Negative     Nitrite, UA 04/01/2024 Negative     Protein, UA 04/01/2024 Negative     Glucose, UA 04/01/2024 Negative     Ketones, UA 04/01/2024 Negative     Urobilinogen, UA 04/01/2024 0.2     Bilirubin, UA 04/01/2024 Negative     Occult Blood, UA 04/01/2024 Negative     Hemoglobin A1C 04/01/2024 7.3 (H)     EAG 04/01/2024 163    Office Visit on 01/24/2024    Component Date Value    POCT SARS-CoV-2 Ag 01/24/2024 Negative     VALID CONTROL 01/24/2024 Valid     RAPID FLU A 01/24/2024 Negative     RAPID FLU B 01/24/2024 Negative    Office Visit on 01/03/2024   Component Date Value    LEUKOCYTE ESTERASE,UA 01/03/2024 500     NITRITE,UA 01/03/2024 +     SL AMB POCT UROBILINOGEN 01/03/2024 3.5     POCT URINE PROTEIN 01/03/2024 1.0      PH,UA 01/03/2024 6.0     BLOOD,UA 01/03/2024 80     SPECIFIC GRAVITY,UA 01/03/2024 1.020     KETONES,UA 01/03/2024 -     BILIRUBIN,UA 01/03/2024 -     GLUCOSE, UA 01/03/2024 -      COLOR,UA 01/03/2024 orange     CLARITY,UA 01/03/2024 cloudy     Urine Culture 01/03/2024 80,000-89,000 cfu/ml Escherichia coli (A)         Imaging: MRI SPINAL CORD MULTIPLE SCLEROSIS WITH AND WITHOUT IV CONTRAST    Result Date: 6/11/2024  Narrative: CLINICAL INDICATION: Follow-up multiple sclerosis TECHNIQUE: Multi-planar multi-sequential MR imaging of the cervical and thoracic spine (spinal cord) was performed before and after the intravenous administration of 12.7 Gadavist (the balance of single use vial(s) has/have been discarded).   COMPARISON: Most recent MRI spinal cord without and with contrast from November 12, 2021 FINDINGS: ALIGNMENT: -Cervical lordosis is straightened. -Thoracic kyphosis is straightened. -Lumbar lordosis is exaggerated. VERTEBRAE: -There remains mild vertebral height loss at C5 and C6 vertebral bodies, similar to prior examination and most suggestive of sequelae of degenerative change. -Cervical vertebral heights are maintained without evidence of acute fracture. -Thoracic vertebral heights are preserved. -Lumbar vertebral heights are preserved. No evidence of acute fracture. -No focal suspicious bone marrow signal abnormality or abnormal osseous enhancement. DISCS: -Cervical disc height loss and disc desiccation is most pronounced and moderate to severe at C5-6 and C6-7. -Multilevel disc height loss and disc desiccation at the thoracic  and lumbar levels. CORD: -No definite signal abnormalities within the cervical cord. - No definite signal abnormalities in the thoracic cord. -The conus medullaris terminates at L1-2. ENHANCEMENT: No abnormal enhancement within the spinal cord or visualized spinal canal. EVALUATION OF INDIVIDUAL LEVELS DEMONSTRATES: Cervical spine: -C2-3: No definite significant canal or neural foraminal stenosis. -C3-4: Right greater than left uncovertebral hypertrophy. Moderate right and mild left neural foraminal stenosis. No significant canal stenosis. -C4-5: Broad-based disc osteophyte complex with right greater than left uncovertebral and facet hypertrophy. There is mild effacement of ventral CSF space. Severe right and mild to moderate left neural foraminal stenosis. -C5-6: Left greater than right uncovertebral hypertrophy. Mild effacement of ventral CSF space and mild bilateral neural foraminal stenosis. -C6-7: Broad-based disc osteophyte complex with mild bilateral neural foraminal stenosis minimal effacement of the CSF space. -C7-T1: No significant canal or neural foraminal stenosis. Thoracic spine: -There is no significant canal stenosis at the thoracic levels. Lumbar spine: -L1-2: Broad-based disc bulge and mild facet hypertrophy with mild canal and mild bilateral neural foraminal stenosis. -L2-3: Broad-based disc bulge and mild facet hypertrophy. No significant canal or neural foraminal stenosis. -L3-4: Broad-based disc bulge, eccentric to the left with left subarticular and foraminal zone disc protrusion. Mild facet hypertrophy with trace facet effusions. Prominent dorsal epidural fat. Moderate to severe canal stenosis. Severe left and moderate right neural foraminal stenosis. -L4-5: Right central disc protrusion. Broad-based disc bulge. Mild facet and ligamentum flavum hypertrophy. Mild canal stenosis. Moderate to severe bilateral neural foraminal stenosis. -L5-S1: Mild facet hypertrophy and broad-based disc bulge  without significant canal stenosis. Moderate bilateral neural foraminal stenosis. PARAVERTEBRAL SOFT TISSUES: There is atrophy of the paraspinal musculature. No definite acute abnormalities in the visualized portions on this nondedicated study. Electronic Signature: I personally reviewed the images and agree with this report. Final Report: Dictated by  and Signed by Attending Nidhi Dickson MD 6/11/2024 10:04 AM    Impression: IMPRESSION: No definite cord signal abnormalities demonstrated. No abnormal enhancement within the visualized spinal canal or cord. Degenerative changes as described in report.    MRI brain w wo contrast    Result Date: 6/4/2024  Narrative: CLINICAL INDICATION: Multiple sclerosis. TECHNIQUE: Multi-planar multi-sequential MR imaging of the brain was performed before and after the intravenous administration of contrast. 12.7 ml of Gadavist was administered (the balance of single use vial(s) has/have been discarded). COMPARISON: Available for review is prior MR study of the brain dated December 6, 2022. FINDINGS: No acute infarction, intracranial hemorrhage or mass lesion. Multiple white matter lesions in keeping with the given diagnosis of multiple sclerosis, including periventricular and juxtacortical lesions. These have remained stable as compared to prior MR study of December 6, 2022. There is a mild to moderate burden of disease. Some of the lesions demonstrate T1 hypointensity suggesting chronicity. There are no new T2/FLAIR hyperintense lesions. There are no enhancing lesions. There are no lesions demonstrating restricted diffusion. Subjectively, there is mild parenchymal volume loss. No hydrocephalus. No extra-axial fluid collections. Flow voids are maintained within the internal carotid arteries bilaterally as well as within the vertebral basilar system. The vertebral basilar system is relatively diminutive due to the presence of prominent posterior communicating arteries. There is  "mild mucosal thickening within the left maxillary sinus which is similar to the prior study. There is nasal septal deviation with a serpiginous course. Electronic Signature: I personally reviewed the images and agree with this report. Final Report: Dictated by  and Signed by Attending Robbie Clay MD 6/4/2024 8:40 AM    Impression: IMPRESSION: White matter lesions compatible with given clinical history of multiple sclerosis. These have remained stable as compared to prior MR study of December 2022. New T2 lesions: 0 New enhancing lesions: 0      Objective:     Physical Exam  Constitutional:       General: He is not in acute distress.     Appearance: He is well-developed. He is obese. He is not ill-appearing or toxic-appearing.   HENT:      Head: Normocephalic and atraumatic.   Neck:      Vascular: No carotid bruit.   Cardiovascular:      Rate and Rhythm: Normal rate and regular rhythm.      Heart sounds: Normal heart sounds.   Pulmonary:      Effort: Pulmonary effort is normal.      Breath sounds: Normal breath sounds.   Musculoskeletal:         General: Normal range of motion.      Cervical back: Normal range of motion and neck supple.   Skin:     General: Skin is warm and dry.   Neurological:      Mental Status: He is alert and oriented to person, place, and time.      Deep Tendon Reflexes: Reflexes are normal and symmetric.   Psychiatric:         Mood and Affect: Mood normal.         Behavior: Behavior normal.         Thought Content: Thought content normal.         Judgment: Judgment normal.           There are no Patient Instructions on file for this visit.    OSKAR Swan    Portions of the record may have been created with voice recognition software.  Occasional wrong word or \"sound a like\" substitutions may have occurred due to the inherent limitations of voice recognition software.  Read the chart carefully and recognize, using context, where substitutions have occurred.  "

## 2024-07-01 NOTE — ASSESSMENT & PLAN NOTE
Stable, continue current medications encourage continued weight loss strategies, low-salt sodium diet regular routine exercise program

## 2024-07-03 ENCOUNTER — TELEPHONE (OUTPATIENT)
Dept: NEUROLOGY | Facility: CLINIC | Age: 64
End: 2024-07-03

## 2024-07-03 NOTE — TELEPHONE ENCOUNTER
Called and spoke to patient. Patient confirmed upcoming apt with Michelle Teran PA-C on 7/11 @ 8 am in the [facility] Office.

## 2024-07-11 ENCOUNTER — OFFICE VISIT (OUTPATIENT)
Dept: NEUROLOGY | Facility: CLINIC | Age: 64
End: 2024-07-11
Payer: MEDICARE

## 2024-07-11 VITALS
DIASTOLIC BLOOD PRESSURE: 76 MMHG | HEART RATE: 90 BPM | HEIGHT: 68 IN | SYSTOLIC BLOOD PRESSURE: 120 MMHG | OXYGEN SATURATION: 97 % | BODY MASS INDEX: 38.95 KG/M2 | TEMPERATURE: 97.6 F | WEIGHT: 257 LBS

## 2024-07-11 DIAGNOSIS — E55.9 VITAMIN D DEFICIENCY: ICD-10-CM

## 2024-07-11 DIAGNOSIS — G35 MULTIPLE SCLEROSIS (HCC): Primary | ICD-10-CM

## 2024-07-11 PROCEDURE — 99204 OFFICE O/P NEW MOD 45 MIN: CPT | Performed by: PHYSICIAN ASSISTANT

## 2024-07-11 NOTE — PATIENT INSTRUCTIONS
Continue glatiramer acetate/glatopa  I added a vitamin D level (blood work) to be done with your next set of labs for your PCP  Continue to use cane to prevent falls.  I entered a referral for PT for balance therapy.  You can do this anytime you want  Continue groups at [facility]  Follow up in 6 months or sooner if needed

## 2024-07-11 NOTE — ASSESSMENT & PLAN NOTE
63-year-old male presents to establish care for established diagnosis of RRMS, which was diagnosed in 2019.  He had several years of imbalance/falls and paresthesias and had a brain MRI that was suspicious for MS.  LP confirmed positive oligoclonal bands and he was started on glatiramer acetate in mid 2020.  His brain imaging has remained stable over time and there is no evidence of any cord lesions on imaging.  His main neurologic care is at [facility] and he plans on still going there at least once a year, but would like to establish care with a local neurologist as well.    No recent exacerbations, no history of IV steroid use.  No history of optic neuritis, prior NMO negative.    He reports insurance recently made him change to Glatopa from glatiramer acetate, but he has not started that yet as he had leftover glatiramer acetate.  He has not had any issues with this medication over time.    He recently had imaging in June 2024 which was stable.    He takes vitamin D 50,000 IU weekly.  Vitamin D level last checked over 1 year ago.  I entered an order to check his vitamin D level and this can be done with his next set of labs for his PCP.    Biggest issue for him is imbalance/falls, improved with using a cane.  He should continue to use his cane to prevent falls.  I did enter referral for PT which she can use when he is ready for balance therapy.    He has an upcoming appointment next week with his neurologist at [facility].  Will plan on seeing him back in 6 months, or sooner if needed.  Will schedule his appointment with Dr. Dolan, as his offices are closer.  He was advised to contact us for any new or worsening symptoms.

## 2024-07-11 NOTE — PROGRESS NOTES
Patient ID: Jesús Sainz is a 63 y.o. male.    Assessment/Plan:    Multiple sclerosis (HCC)  63-year-old male presents to establish care for established diagnosis of RRMS, which was diagnosed in 2019.  He had several years of imbalance/falls and paresthesias and had a brain MRI that was suspicious for MS.  LP confirmed positive oligoclonal bands and he was started on glatiramer acetate in mid 2020.  His brain imaging has remained stable over time and there is no evidence of any cord lesions on imaging.  His main neurologic care is at [facility] and he plans on still going there at least once a year, but would like to establish care with a local neurologist as well.    No recent exacerbations, no history of IV steroid use.  No history of optic neuritis, prior NMO negative.    He reports insurance recently made him change to Glatopa from glatiramer acetate, but he has not started that yet as he had leftover glatiramer acetate.  He has not had any issues with this medication over time.    He recently had imaging in June 2024 which was stable.    He takes vitamin D 50,000 IU weekly.  Vitamin D level last checked over 1 year ago.  I entered an order to check his vitamin D level and this can be done with his next set of labs for his PCP.    Biggest issue for him is imbalance/falls, improved with using a cane.  He should continue to use his cane to prevent falls.  I did enter referral for PT which she can use when he is ready for balance therapy.    He has an upcoming appointment next week with his neurologist at [facility].  Will plan on seeing him back in 6 months, or sooner if needed.  Will schedule his appointment with Dr. Dolan, as his offices are closer.  He was advised to contact us for any new or worsening symptoms.       Diagnoses and all orders for this visit:    Multiple sclerosis (HCC)  Orders:  -     Ambulatory Referral to Neurology  -     Ambulatory Referral to Physical Therapy; Future    Vitamin D deficiency  -      Vitamin D 25 hydroxy; Future           Subjective:    HPI    Jesús Sainz is a 63 year old male who presents today for new patient evaluation of established diagnosis of Multiple Sclerosis.    Patient with PMH of HTN, HLD, DVT s/p shoulder surgery in 2016.  He has been followed at [facility] with Dr. Renae Mccarthy MD since 2019.  He had a work accident in 2015 and injured his R shoulder, requiring surgery.  Retired from welding in 2017.  At time of consult at [facility], patient reported approx. 2 years of dizziness and imbalance, drifting to the left.  MRI brain with lesions suspicious of MS including involvement of the corpus callosum.  Cord imaging negative at the time.  Labs included negative NMO, Lyme, Sjogren, ANCA, anti-smith Ab, HIV, ACE.  There was suspicion of vestibular issues causing imbalance and sent to vestibular rehab. He had a lumbar puncture in 2/2020. CSF Results with positive OCB (3), 1 WBC, 0 RBC, 37 protein and 64 glucose. VDRL ng, Gram stain neg. Lyme CSF neg. CSF ACE: nl.  He was started on glatiramer acetate around May 2020 for likely RRMS.    On chart review, imaging has remained stable since time of diagnosis.  No history of ON.  No IV steroids for exacerbations.  No cord lesions to date.    Today, patient reports that he moved to [place] several years ago and has been traveling up to [facility] for his neurologic care.  His neurologist suggested that he establish care with a local neurologist in case of any exacerbations or immediate needs.  He tells me he still plans on having his primary neurologist at [facility].  He denies any new neurologic symptoms.  He tells me his biggest issues remain balance difficulty and falls.  Prior to diagnosis he was having paresthesias and felt that there were bugs crawling on him all over, as well as muscle cramps and stiffness.  He has been using a cane now for a few years and this has been quite helpful.  He keeps a cane in his house, in his car, as well as  "anywhere else he needs it.  He has done PT in the past which was helpful for his balance.  He continues on glatiramer acetate 3 times a week and tolerates this well.  He says insurance recently made him switch to Glatopa, but he has not started that yet since he had leftover glatiramer acetate.  He sees an ophthalmologist and [facility] ([facility]).  He denies any significant vision issues including diplopia, loss of vision in 1 eye.  He does report light sensitivity and wear sunglasses all the time.  He was having some swallowing difficulties and had a swallow study less than a year ago which only showed acid reflux.  He reports urinary frequency but no incontinence, retention or significant bowel issues such as constipation.  He feels his energy levels are usually adequate, but some days he is more tired and needs to take a nap.  He denies snoring.  He reports some issues with STM and recall.  He says his left hand is tingly and I found in the notes that he had an EMG in 2016 which showed left cubital tunnel.  He takes vitamin D 50,000 IU weekly.  He reports sensitivity to the heat, as well as cold and stress exacerbate his symptoms.  He does try to exercise and do home exercise program learned in prior PT sessions.  He has chronic shoulder issues.  He lives at home with his wife and adult daughter.  No family history of MS or other autoimmune disorders that he is aware of.  He goes to the MS program at [facility] and very much enjoys this.    The following portions of the patient's history were reviewed and updated as appropriate: current medications, past family history, past medical history, past social history, past surgical history, and problem list.      Objective:    Blood pressure 120/76, pulse 90, temperature 97.6 °F (36.4 °C), temperature source Temporal, height 5' 8\" (1.727 m), weight 117 kg (257 lb), SpO2 97%.    Physical Exam  Constitutional:       Appearance: Normal appearance. "   Eyes:      Extraocular Movements: EOM normal.      Pupils: Pupils are equal, round, and reactive to light.   Neurological:      Mental Status: He is alert.      Motor: Motor strength is normal.     Deep Tendon Reflexes: Reflexes are normal and symmetric.   Psychiatric:         Mood and Affect: Mood normal.         Speech: Speech normal.         Behavior: Behavior normal.         Neurological Exam  Mental Status  Alert. Oriented to person, place, time and situation. Recent and remote memory are intact. Speech is normal. Language is fluent with no aphasia. Attention and concentration are normal.    Cranial Nerves  CN II: Visual fields full to confrontation.  CN III, IV, VI: Extraocular movements intact bilaterally. Pupils equal round and reactive to light bilaterally.  CN V: Facial sensation is normal.  CN VII: Slightly flattened NLF on the right .  CN VIII: Bilateral hearing aids.  CN IX, X: Palate elevates symmetrically  CN XI: Shoulder shrug strength is normal.  CN XII: Tongue midline without atrophy or fasciculations.    Motor   Normal muscle tone. No abnormal involuntary movements. Strength is 5/5 throughout all four extremities.    Sensory  Light touch is normal in upper and lower extremities. Vibration is normal in upper and lower extremities.     Reflexes  Deep tendon reflexes are 2+ and symmetric in all four extremities.    Coordination  Right: Finger-to-nose normal. Rapid alternating movement normal.Left: Finger-to-nose normal. Rapid alternating movement normal.    Gait    Wide based, using cane .        ROS:    Review of Systems   Constitutional:  Negative for fatigue and fever.   HENT:  Positive for hearing loss (wears hearing aids). Negative for ear pain, tinnitus and trouble swallowing.    Eyes:  Positive for visual disturbance (light sensitivity). Negative for pain.   Respiratory:  Negative for cough and shortness of breath.    Cardiovascular:  Negative for chest pain and palpitations.    Gastrointestinal:  Negative for abdominal pain and vomiting.   Endocrine: Positive for heat intolerance.   Genitourinary:  Negative for dysuria and hematuria.   Musculoskeletal:  Positive for gait problem (imbalance). Negative for arthralgias and back pain.   Skin:  Negative for color change and rash.   Neurological:  Negative for dizziness, tremors, seizures, syncope, weakness, numbness and headaches.   Psychiatric/Behavioral:  Negative for sleep disturbance.    All other systems reviewed and are negative.    I personally reviewed and updated the ROS as appropriate

## 2024-08-13 ENCOUNTER — EVALUATION (OUTPATIENT)
Age: 64
End: 2024-08-13
Payer: MEDICARE

## 2024-08-13 DIAGNOSIS — G35 MULTIPLE SCLEROSIS (HCC): Primary | ICD-10-CM

## 2024-08-13 PROCEDURE — 97530 THERAPEUTIC ACTIVITIES: CPT | Performed by: PHYSICAL THERAPIST

## 2024-08-13 PROCEDURE — 97162 PT EVAL MOD COMPLEX 30 MIN: CPT | Performed by: PHYSICAL THERAPIST

## 2024-08-13 NOTE — PROGRESS NOTES
PT Evaluation          POC expires Unit limit Auth Expiration date PT/OT + Visit Limit? Co-Insurance   24 N/a N/a BOMN 25$ Co-Pay                               Visit/Unit Tracking  AUTH Status:  Date               N/a Used 1               Remaining                           Today's date: 2024  Patient name: Jesús Sainz  : 1960  MRN: [id number]  Referring provider: Michelle Teran PA-C  Dx:   Encounter Diagnosis     ICD-10-CM    1. Multiple sclerosis (HCC)  G35 Ambulatory Referral to Physical Therapy    Continue current care [facility] neurology, referral placed for in network neurologist            Assessment  Assessment details: Patient is a 63 y.o. Male who presents to skilled outpatient PT with higher fatigability, decreased balance and increased risk of falling as a result of multiple sclerosis. While patient has not had any recent falls, they have had multiple LOB that have come close to becoming falls and are increasing fear of eventually falling because of stability impairments. Patient was able to perform 5xSTS 18.07s during the first trial and 16.84s during the second trial, which were both out of the age related norms. Next visit should assess 6 MWT to see cardiorespiratory endurance and fatiguability, as well as the BEAN for a second trial (following 6 MWT), and 10 meter walk test/TUG to assess gait speed. Patient demonstrates an increased risk of falling with a scoring of 36/56 on the BEAN balance scale and 64.38% on ABC scale. Specific difficulties were noted with narrow SOHAN, single limb balance, and turning, which are all essential parts of ambulation/community navigation. Significant education was provided about expectations, POC, planned interventions, and results of testing. Patient would benefit from skilled Physical Therapy to address the impairments found during examination, improving QOL, and  decreasing risk of falls. Patient was agreeable to plan of care.         Impairments: Abnormal gait, Activity intolerance, Impaired balance, Impaired physical strength, Lacks appropriate HEP, Poor posture, Pain with function, and Abnormal movement  Understanding of Dx/Px/POC: Good  Prognosis: Good      Patient verbalized understanding of POC.         Please contact me if you have any questions or recommendations. Thank you for the referral and the opportunity to share in Jesús Sainz's care.        Plan  Plan details: maintain strength, improve stability during functional activities, and decreasing fall risk  Patient would benefit from: Skilled PT  Planned modality interventions: Cryotherapy, TENS, and Thermotherapy: Hydrocollator Packs  Planned therapy interventions: Abdominal trunk stabilization, ADL training, Balance, Balance/WB training, Body mechanics training, Functional ROM exercises, Gait training, HEP, Joint mobilization, Manual therapy, Motor coordination training, Neuromuscular re-education, Patient education, Postural training, Strengthening, Stretching, Therapeutic activities, Therapeutic exercises, Therapeutic training, Transfer training, and Activity modification  Frequency: 1-3x a week  Plan of Care beginning date: 08/13/24  Plan of Care expiration date: 3 months - 11/13/2024  Treatment plan discussed with: Patient         Goals  Short Term Goals (4 weeks):    - Patient will improve time on TUG by 2.9 seconds to facilitate improved safety in all ambulation and increasing independence  - Patient will improve scoring on DGI by 2.6 points to decrease risk of falling and improving functional independence  - Patient will be independent in basic HEP 2-3 weeks  - Patient will improve 5xSTS score by 2.3 seconds from 16.84 seconds to 14.54 seconds to promote improved LE functional strength needed for ADLs    Long Term Goals (12 weeks):  - Patient will be independent in a comprehensive home exercise  program  - Patient will improve gait speed by 0.59 ft/sec to improve safety with community ambulation  - Patient will improve BEAN by 6 points from 36/56 to 46/56 to facilitate return to safe independent ambulation  - Patient will improve scoring on FGA and achieve 23/30 to progress safety with dynamic tasks and demonstrate a decreased risk of falls  - Patient will be able to demonstrate HT in gait without veering  - Patient will improve 6 Minute Walk Test score by 190 feet to promote improved cardiovascular endurance  - Patient will report 50% reduction in near falls in order to improve safety with functional tasks and reduce his risk for falls  - Patient will report going on walks at least 3 days per week to promote independence and improved cardiovascular endurance  - Patient will be able to ascend/descend stairs reciprocally without UE assist to promote independence and safety with ADLs  - Patient will report 50% reduction in near falls when ambulating on uneven terrain      Cut off score    All date taken from APTA Neuro Section or Rehab Measures      Bean:  Susi et al, 2009  Older Adults MDC:  0-24: 4.6 points  25-34: 6.3 points  35-44: 4.9 points  45-56: 3.3 points    Bean et al, 1992  < 45/56 increased falls risk 5xSTS: Reina et al 2010  MDC: 2.3 sec  Age Norms:  60-69: 11.1 sec  70-79: 12.6 sec  80-89: 14.8 sec   TUG  Brooke et al., 2005  MDC: 2.9 sec    Cut off score:  >13.5 sec community dwelling adults  >32.2 frail elderly  <20 I for basic transfers  >30 dependent on transfers 10 Meter Walk Test:   Chika et al., 2006  Small meaningful change: 0.06 m/s  Substantial meaningful change: 0.14 m/s  MCID: 0.16 m/s    < 0.4 m/s household ambulators  0.4 - 0.8 m/s limited community ambulators  > 0.8 m/s community ambulators   FGA: Lynda et al., 2010  MCID: 4.2 pts  Geriatrics/community < 22/30 fall risk  Geriatrics/community < 20/30 unexplained falls    DGI  MDC: vestibular - 4 pts  MDC: geriatric/community  - 3 pts  Falls risk <19/24 6 Minute Walk Test  Chika et al., 2006  MDC: 60.98 m (200.01 ft)    Margarito, Tani, & Joelle, 2012  MCID: 34.4 m    Age Norms  60-69: M - 1876 ft   F - 1765 ft  70-79: M - 1729 ft   F - 1545 ft  80-89: M - 1368 ft   F - 1286 ft   Expanded Disability Status Scale (EDSS)  0.0: Normal neurological function  1.0: No disability with only minimal signs  2.0: Minimal disability  3.0: Moderate disability  4.0: Relatively severe disability  5.0: Disability affects full daily activities  6.0: Assistance required to walk and work  7.0: Essentially restricted to w/c  8.0: Restricted to bed or w/c  9.0: Bedridden or unable to effectively communicate or eat/swallow  10.0: Death    Norms:  0-4.5: Independent community ambulator  5.0-9.5: Ambulation impairments (AD required)  6.5: Non-ambulatory or w/c bound  7.0: W/C bound permanently Modified Cheryl  0: No increase in tone  1: Catch and release or min resistance at end range  1+: Catch f/b min resistance throughout remainder (< half ROM)  2: Easily moved, but more marked tone throughout most ROM  3: Significant tone, PROM difficult  4: Rigid   12 Item MS Walking Scale (MD Fanny et al, 2008)  Healthy Adults: 2.2  MS: 28.2 MS Fatigue Impact Scale  MDC: 19.3%  Norm: 33         Subjective  No pins and needles in legs, but does have pins and needles in the arms sometimes. Had a prior shoulder surgery in the right shoulder and has some tears in the left shoulder.    History of Present Illness  Primary AD: primarily uses SPC and walker (intermittently)  Assist level at home: Independent  WC usage: n/a  MS Type: Relapsing Remitting    Pain  Current pain ratin/10  At best pain ratin/10  At worst pain ratin-3/10  Location: shoulder and knee  Aggravating factors: sleeping position    Social Support  Steps to enter house: 4  Stairs in house: full flight   Lives in: multistory home  Lives with: wife and daughter    Employment status: Retired  Hand  dominance: right    Treatments  Previous treatment: ENT, Neurology  Current treatment: skilled Physical Therapy      Objective     LE MMT  - R Hip Flexion: 4/5   - L Hip Flexion: 4/5  - R Hip Extension: 4-/5  - L Hip Extension: 4-/5  - R Hip Abduction: 3-/5  - L Hip abduction: 3/5  - R Hip adduction: 3-/5  - L Hip adduction: 3/5  - R Knee Extension: 4/5  - L Knee Extension: 4+/5  - R Knee Flexion: 4/5   - L Knee Flexion: 4+/5  - R Ankle DF: 4/5   - L Ankle DF: 4/5  - R Ankle PF: 4-/5   - L Ankle PF: 4/5    Sensation  - Light touch: intact    Coordination  Upon observation, patient does not have any significant disruptions in coordination patterns.      Vision  - Glasses: No  - Abnormalities: No            Outcome Measures Initial Eval  8/13        5xSTS 1: 18.07 sec  2: 16.84 sec        TUG  - Regular      - Cognitive   NV   1:  sec  2:  sec    1:  sec  2:  sec        10 meter NV  1: m/s  2:  m/s        BEAN 1: 36/56  2: NV/56        30 sec STS Not tested        6MWT NV  1:  ft  2:  ft        12 Item MS Walking Scale Not tested        DHI Not tested        EDSS Not tested        ABC 64.38%                      Precautions: typical precautions  Past Medical History:   Diagnosis Date    Arthritis 2015    In my hands, rt shoulder    Cancer ([facility]) 2010    [place]il twice in nose    Diabetes mellitus ([facility])     Borderline    Diverticulitis of colon 2021    Colon oscapy they found it    GERD (gastroesophageal reflux disease) 2023    [facility] did throat test, said i have    Heart murmur 1960    Told i was born with it. Had checked not pronounced    High blood pressure     2021    HL (hearing loss) 2016    From work    Hypertension 2020    Kidney stone     Had stones twice    Memory loss 2020    From ms short term loss    MS (multiple sclerosis) ([facility])     2020    Urinary tract infection     Had infection last year

## 2024-08-19 ENCOUNTER — APPOINTMENT (OUTPATIENT)
Age: 64
End: 2024-08-19
Payer: MEDICARE

## 2024-08-19 DIAGNOSIS — G35 MULTIPLE SCLEROSIS (HCC): Primary | ICD-10-CM

## 2024-08-21 ENCOUNTER — OFFICE VISIT (OUTPATIENT)
Age: 64
End: 2024-08-21
Payer: MEDICARE

## 2024-08-21 DIAGNOSIS — G35 MULTIPLE SCLEROSIS (HCC): Primary | ICD-10-CM

## 2024-08-21 PROCEDURE — 97530 THERAPEUTIC ACTIVITIES: CPT

## 2024-08-21 PROCEDURE — 97112 NEUROMUSCULAR REEDUCATION: CPT

## 2024-08-21 NOTE — PROGRESS NOTES
Daily Note     Today's date: 2024  Patient name: Jesús Sainz  : 1960  MRN: [id number]  Referring provider: Michelle Teran PA-C  Dx:   Encounter Diagnosis     ICD-10-CM    1. Multiple sclerosis (HCC)  G35                      Subjective: pt reports feeling tired and only getting 2-3 hours of sleep.       Objective: See treatment diary below  Circuit training   2 minutes on 3 minutes off   STS   HKM   Lateral side stepping  Tandem ambulating   Hurdles fwd and lateral     Assessment: Tolerated treatment well. Initiated circuit training this session. Educated patient about benefits of circuit training. Patient did have the most difficulty with tandem walking and required the most UE assistance to perform. Patient was able to perform most of the exercises without UE assistance. Patient demonstrated fatigue post treatment, exhibited good technique with therapeutic exercises, and would benefit from continued PT      Plan: Continue per plan of care.      POC expires Unit limit Auth Expiration date PT/OT + Visit Limit? Co-Insurance   24 N/a N/a BOMN 25$ Co-Pay                               Visit/Unit Tracking  AUTH Status:  Date              N/a Used 1 2              Remaining

## 2024-08-26 ENCOUNTER — OFFICE VISIT (OUTPATIENT)
Age: 64
End: 2024-08-26
Payer: MEDICARE

## 2024-08-26 DIAGNOSIS — G35 MULTIPLE SCLEROSIS (HCC): Primary | ICD-10-CM

## 2024-08-26 PROCEDURE — 97112 NEUROMUSCULAR REEDUCATION: CPT | Performed by: PHYSICAL THERAPIST

## 2024-08-26 NOTE — PROGRESS NOTES
"Daily Note     Today's date: 2024  Patient name: Jesús Sainz  : 1960  MRN: [id number]  Referring provider: Michelle Teran PA-C  Dx:   Encounter Diagnosis     ICD-10-CM    1. Multiple sclerosis (HCC)  G35                      Subjective: pt reports feeling tired and only getting 2-3 hours of sleep.       Objective: See treatment diary below  Circuit training   3 minutes on 2minutes off   STS   HKM   Tandem ambulating   Lateral side stepping on 1 foam beam  Hurdles fwd stepping at 6\" height (4 hurdles)  Hurdles lateral stepping at 9\" height (4 hurdles)  w 2 foam pads   Hurdles Back stepping at 9\" height (4 hurdles)  w 2 foam pads     Assessment: Tolerated treatment well. Initiated circuit training this session. Progressed exercises with iso hold and foam surfaces. Challenged with increase in height with hurdles. LOB 2 times with self corrected // bars with backwards walking. Patient demonstrated fatigue post treatment, exhibited good technique with therapeutic exercises, and would benefit from continued PT      Plan: Continue per plan of care.      POC expires Unit limit Auth Expiration date PT/OT + Visit Limit? Co-Insurance   24 N/a N/a BOMN 25$ Co-Pay                               Visit/Unit Tracking  - IE                                                  "

## 2024-08-28 ENCOUNTER — OFFICE VISIT (OUTPATIENT)
Age: 64
End: 2024-08-28
Payer: MEDICARE

## 2024-08-28 DIAGNOSIS — G35 MULTIPLE SCLEROSIS (HCC): Primary | ICD-10-CM

## 2024-08-28 PROCEDURE — 97112 NEUROMUSCULAR REEDUCATION: CPT

## 2024-08-28 NOTE — PROGRESS NOTES
"Daily Note     Today's date: 2024  Patient name: Jesús Sainz  : 1960  MRN: [id number]  Referring provider: Michelle Teran PA-C  Dx:   Encounter Diagnosis     ICD-10-CM    1. Multiple sclerosis (HCC)  G35                      Subjective: pt reports feeling okay today       Objective: See treatment diary below  Circuit training   3 minutes on 2minutes off   4# AW   STS with 7.5# tidal tank   HKM   Tandem ambulating   Lateral side stepping on 1 foam beam  Hurdles fwd stepping at 6\" height (4 hurdles)  Hurdles lateral stepping at 9\" height (4 hurdles)  w 2 foam pads   Hurdles Back stepping at 9\" height (4 hurdles)  w 2 foam pads     Assessment: Tolerated treatment well. Continued with circuit training this session. Added weight with exercises this session. Pt reports being challenged with exercises but was able to perform them. Pt continues to experience LOB with backwards stepping over hurdles, especially when he's stepping onto the foam pad. Patient did require minimal assistance from therapist in order to recover. Patient demonstrated fatigue post treatment, exhibited good technique with therapeutic exercises, and would benefit from continued PT      Plan: Continue per plan of care.      POC expires Unit limit Auth Expiration date PT/OT + Visit Limit? Co-Insurance   24 N/a N/a BOMN 25$ Co-Pay                               Visit/Unit Tracking  - IE                                                 "

## 2024-09-03 ENCOUNTER — OFFICE VISIT (OUTPATIENT)
Age: 64
End: 2024-09-03
Payer: MEDICARE

## 2024-09-03 DIAGNOSIS — G35 MULTIPLE SCLEROSIS (HCC): Primary | ICD-10-CM

## 2024-09-03 PROCEDURE — 97112 NEUROMUSCULAR REEDUCATION: CPT

## 2024-09-03 NOTE — PROGRESS NOTES
"Daily Note     Today's date: 9/3/2024  Patient name: Jesús Sainz  : 1960  MRN: [id number]  Referring provider: Michelle Teran PA-C  Dx:   Encounter Diagnosis     ICD-10-CM    1. Multiple sclerosis (HCC)  G35         Start Time: 0800  Stop Time: 08  Total time in clinic (min): 38 minutes    Subjective: He reports he is doing well today.       Objective: See treatment diary below  Circuit training   3 minutes on 2 minutes off   4# AW    - HKM   - Tandem ambulating   - Rockerboard anterior/posterior   - Hurdles fwd stepping at 6\" height (4 hurdles)  - Hurdles lateral stepping at 9\" height (4 hurdles) /c 2 foam pads   - Picking up hurdles in // bars  - Walking with increased speed       Assessment: Tolerated treatment well. He did have some difficulty with balance in single leg stance conditions and when reaching down to the floor. He has good awareness of when he is losing his balance and his righting reactions are appropriate. He initially had difficulty with the rockerboard, but this was improved after giving verbal cues to move his hips versus moving his trunk. Patient demonstrated fatigue post treatment, exhibited good technique with therapeutic exercises, and would benefit from continued PT.      Plan: Continue per plan of care.      POC expires Unit limit Auth Expiration date PT/OT + Visit Limit? Co-Insurance   24 N/a N/a BOMN 25$ Co-Pay                               Visit/Unit Tracking  - IE 8/21 8/26 8/28 9/3                                               "

## 2024-09-05 ENCOUNTER — OFFICE VISIT (OUTPATIENT)
Age: 64
End: 2024-09-05
Payer: MEDICARE

## 2024-09-05 DIAGNOSIS — G35 MULTIPLE SCLEROSIS (HCC): Primary | ICD-10-CM

## 2024-09-05 PROCEDURE — 97112 NEUROMUSCULAR REEDUCATION: CPT

## 2024-09-05 NOTE — PROGRESS NOTES
"Daily Note     Today's date: 2024  Patient name: Jesús Sainz  : 1960  MRN: [id number]  Referring provider: Michelle Teran PA-C  Dx:   Encounter Diagnosis     ICD-10-CM    1. Multiple sclerosis (HCC)  G35                    Subjective: He reports he is doing well today. Denies changes.      Objective: See treatment diary below    NMR in // bars with 4# aw BLE  Circuit training @ 3 minutes on 2 minutes off   - HKM   - Tandem ambulation  - STS with 7.5# tidal tank hold, focus on 3s ecc control  - Side step foam beam + blaze pod tap outs (R purple/yellow, L blue/orange)  - Curb step sequence (L tap, R tap, alt LE step up) on 6\"   - Fwd/bwd hurdles 6\" (4)       Assessment: Tolerated treatment well. He did have some difficulty with balance in single leg stance conditions and on compliant surfaces. He has good awareness of when he is losing his balance and his righting reactions are appropriate. Patient demonstrated fatigue post treatment, exhibited good technique with therapeutic exercises, and would benefit from continued PT.      Plan: Continue per plan of care.      POC expires Unit limit Auth Expiration date PT/OT + Visit Limit? Co-Insurance   24 N/a N/a BOMN 25$ Co-Pay                               Visit/Unit Tracking  - IE 8/21 8/26 8/28 9/3 9/5                                              "

## 2024-09-09 ENCOUNTER — OFFICE VISIT (OUTPATIENT)
Age: 64
End: 2024-09-09
Payer: MEDICARE

## 2024-09-09 DIAGNOSIS — G35 MULTIPLE SCLEROSIS (HCC): Primary | ICD-10-CM

## 2024-09-09 PROCEDURE — 97112 NEUROMUSCULAR REEDUCATION: CPT

## 2024-09-09 NOTE — PROGRESS NOTES
"Daily Note     Today's date: 2024  Patient name: Jesús Sainz  : 1960  MRN: [id number]  Referring provider: Michelle Teran PA-C  Dx:   Encounter Diagnosis     ICD-10-CM    1. Multiple sclerosis (HCC)  G35                    Subjective: He reports fatigue following previous session. Denies changes. Pt reports that it feels easier to step into his shower..       Objective: See treatment diary below    NMR in // bars with 4# aw BLE  Circuit training @ 3 minutes on 2 minutes off   - HKM   - Tandem ambulation on foam beam   - STS with 10# tidal tank hold, focus on 3s ecc control  - Side step foam beam + blaze pod tap outs (R purple/yellow, L blue/orange)  - Curb step sequence (L tap, R tap, alt LE step up) on 6\" - deferred   - Fwd/bwd hurdles (2) 6\" (2)9\"       Assessment: Tolerated treatment well. Progressed tandem ambulation this session with foam beam. Pt had increased difficulty with foam beam but was able to perform with minimal LOB. Progressed two mikel heights to be 9 inches to further challenge patient. Pt was able to perform without LOB. Continue to progress. Patient demonstrated fatigue post treatment, exhibited good technique with therapeutic exercises, and would benefit from continued PT.      Plan: Continue per plan of care.      POC expires Unit limit Auth Expiration date PT/OT + Visit Limit? Co-Insurance   24 N/a N/a BOMN 25$ Co-Pay                               Visit/Unit Tracking  - IE 8/21 8/26 8/28 9/3 9/5                                              "

## 2024-09-11 ENCOUNTER — OFFICE VISIT (OUTPATIENT)
Age: 64
End: 2024-09-11
Payer: MEDICARE

## 2024-09-11 DIAGNOSIS — G35 MULTIPLE SCLEROSIS (HCC): Primary | ICD-10-CM

## 2024-09-11 PROCEDURE — 97112 NEUROMUSCULAR REEDUCATION: CPT

## 2024-09-11 NOTE — PROGRESS NOTES
"Daily Note     Today's date: 2024  Patient name: Jesús Sainz  : 1960  MRN: [id number]  Referring provider: Michelle Teran PA-C  Dx:   Encounter Diagnosis     ICD-10-CM    1. Multiple sclerosis (HCC)  G35                    Subjective: pt reports he's feeling good, pt reports having to take a nap after last session but didn't feel wiped out for the rest of the day.       Objective: See treatment diary below    NMR in // bars with 4# aw BLE  Circuit training @ 3 minutes on 2 minutes off   - HKM   - Tandem ambulation on foam beam   - STS with 10# tidal tank hold, focus on 3s ecc control  - Side step foam beam + blaze pod tap outs (R purple/yellow, L blue/orange)  - Curb step sequence (L tap, R tap, alt LE step up) on 6\" - deferred   - Fwd/bwd/lat  hurdles (2) 6\" (2)9\"       Assessment: Tolerated treatment well. Pt did experience LOB with backwards stepping over hurdles but was able to self recover with parallel bars. Pt does have the most difficulty with tandem walking on foam, taking longer to perform due to focusing on his breath and taking cautious steps. Continue to progress. Patient demonstrated fatigue post treatment, exhibited good technique with therapeutic exercises, and would benefit from continued PT.      Plan: Continue per plan of care.      POC expires Unit limit Auth Expiration date PT/OT + Visit Limit? Co-Insurance   24 N/a N/a BOMN 25$ Co-Pay                               Visit/Unit Tracking  - IE 8/21 8/26 8/28 9/3 9/5 9/11                                             "

## 2024-09-13 ENCOUNTER — TELEPHONE (OUTPATIENT)
Age: 64
End: 2024-09-13

## 2024-09-13 NOTE — TELEPHONE ENCOUNTER
Wife called asking for a  to  referral sent to the ENT for his annual check for his hearing aids please fax referral to [facility] [phone]. Please call so that she may pick it up also, just in case the fax does not get received by the other dr

## 2024-09-16 ENCOUNTER — EVALUATION (OUTPATIENT)
Age: 64
End: 2024-09-16
Payer: MEDICARE

## 2024-09-16 DIAGNOSIS — H91.93 HEARING DECREASED, BILATERAL: Primary | ICD-10-CM

## 2024-09-16 DIAGNOSIS — G35 MULTIPLE SCLEROSIS (HCC): Primary | ICD-10-CM

## 2024-09-16 PROCEDURE — 97112 NEUROMUSCULAR REEDUCATION: CPT

## 2024-09-16 NOTE — PROGRESS NOTES
PT Re-Evaluation / Progress Report         POC expires Unit limit Auth Expiration date PT/OT + Visit Limit? Co-Insurance   24 N/a N/a BOMN 25$ Co-Pay                               Visit/Unit Tracking  - IE  9/3 9 - PN                                            Today's date: 2024  Patient name: Jesús Sainz  : 1960  MRN: [id number]  Referring provider: Michelle Teran PA-C  Dx:   Encounter Diagnosis     ICD-10-CM    1. Multiple sclerosis (HCC)  G35             Assessment  Assessment details: Patient is a 63 y.o. Male who presents to skilled outpatient PT with higher fatigability, decreased balance and increased risk of falling, secondary to being diagnosed with multiple sclerosis. While patient has not had any recent falls, he has had multiple losses of balance. He is now able to  items and get in and out of the shower with improved stability. He is at an increased risk for falls or below age-matched norms for the 5xSTS, TUG, 10mWT, Bean, 2MWT, and ABC. Specific difficulties were noted with narrow SOHAN, single limb balance, and eyes closed with feet together, which are all essential parts of ambulation/community navigation. He continues to be at an increased risk for falls and functioning below his PLOF, which has negatively impacted his QOL. He would continue to benefit from skilled Physical Therapy to address the impairments found during re-examination, improving QOL, and decreasing risk of falls. Patient was agreeable to plan of care.    Outcome Measures Initial Eval      5xSTS 1: 18.07 sec  2: 16.84 sec 17.64 sec no hands    TUG  - Regular      - Cognitive   NV      15.65 sec no AD    14.34 sec no AD    10 meter NV 0.99 m/s with SPC    25 Foot Walk Test NT 8.59 sec with SPC    BEAN 36/56   44/56    30 sec STS Not tested 8    2MWT  ft with SPC    12 Item MS  Walking Scale Not tested 73%    ABC 64.38% 63.75%        Impairments: Abnormal gait, Activity intolerance, Impaired balance, Impaired physical strength, Lacks appropriate HEP, Poor posture, Pain with function, and Abnormal movement  Understanding of Dx/Px/POC: Good  Prognosis: Good      Patient verbalized understanding of POC.       Please contact me if you have any questions or recommendations. Thank you for the referral and the opportunity to share in Jesús Sainz's care.      Plan  Plan details: maintain strength, improve stability during functional activities, and decreasing fall risk  Patient would benefit from: Skilled PT  Planned modality interventions: Cryotherapy, TENS, and Thermotherapy: Hydrocollator Packs  Planned therapy interventions: Abdominal trunk stabilization, ADL training, Balance, Balance/WB training, Body mechanics training, Functional ROM exercises, Gait training, HEP, Joint mobilization, Manual therapy, Motor coordination training, Neuromuscular re-education, Patient education, Postural training, Strengthening, Stretching, Therapeutic activities, Therapeutic exercises, Therapeutic training, Transfer training, and Activity modification  Frequency: 1-3x a week  Plan of Care beginning date: 08/13/24  Plan of Care expiration date: 3 months - 11/13/2024  Treatment plan discussed with: Patient      Goals  Short Term Goals (4 weeks):    - Patient will improve time on TUG by 2.9 seconds to facilitate improved safety in all ambulation and increasing independence - Ongoing  - Patient will improve scoring on DGI by 2.6 points to decrease risk of falling and improving functional independence - Ongoing  - Patient will be independent in basic HEP 2-3 weeks - Met  - Patient will improve 5xSTS score by 2.3 seconds from 16.84 seconds to 14.54 seconds to promote improved LE functional strength needed for ADLs - Ongoing    Long Term Goals (12 weeks):  - Patient will be independent in a comprehensive home  exercise program  - Patient will improve gait speed by 0.59 ft/sec to improve safety with community ambulation  - Patient will improve BEAN by 6 points from 36/56 to 46/56 to facilitate return to safe independent ambulation  - Patient will improve scoring on FGA and achieve 23/30 to progress safety with dynamic tasks and demonstrate a decreased risk of falls  - Patient will be able to demonstrate HT in gait without veering  - Patient will improve 6 Minute Walk Test score by 190 feet to promote improved cardiovascular endurance  - Patient will report 50% reduction in near falls in order to improve safety with functional tasks and reduce his risk for falls  - Patient will report going on walks at least 3 days per week to promote independence and improved cardiovascular endurance  - Patient will be able to ascend/descend stairs reciprocally without UE assist to promote independence and safety with ADLs  - Patient will report 50% reduction in near falls when ambulating on uneven terrain      Cut off score    All date taken from [facility]TA Neuro Section or Rehab Measures      Bean:  Susi et al, 2009  Older Adults MDC:  0-24: 4.6 points  25-34: 6.3 points  35-44: 4.9 points  45-56: 3.3 points    Bean et al, 1992  < 45/56 increased falls risk 5xSTS: Reina et al 2010  MDC: 2.3 sec  Age Norms:  60-69: 11.1 sec  70-79: 12.6 sec  80-89: 14.8 sec   TUG  Brooke et al., 2005  MDC: 2.9 sec    Cut off score:  >13.5 sec community dwelling adults  >32.2 frail elderly  <20 I for basic transfers  >30 dependent on transfers 10 Meter Walk Test:   Chika et al., 2006  Small meaningful change: 0.06 m/s  Substantial meaningful change: 0.14 m/s  MCID: 0.16 m/s    < 0.4 m/s household ambulators  0.4 - 0.8 m/s limited community ambulators  > 0.8 m/s community ambulators   FGA: Lynda et al., 2010  MCID: 4.2 pts  Geriatrics/community < 22/30 fall risk  Geriatrics/community < 20/30 unexplained falls    DGI  MDC: vestibular - 4 pts  MDC:  geriatric/community - 3 pts  Falls risk <19/24 6 Minute Walk Test  Chika et al., 2006  MDC: 60.98 m (200.01 ft)    Margarito, Tani, & Joelle, 2012  MCID: 34.4 m    Age Norms  60-69: M - 1876 ft   F - 1765 ft  70-79: M - 1729 ft   F - 1545 ft  80-89: M - 1368 ft   F - 1286 ft   Expanded Disability Status Scale (EDSS)  0.0: Normal neurological function  1.0: No disability with only minimal signs  2.0: Minimal disability  3.0: Moderate disability  4.0: Relatively severe disability  5.0: Disability affects full daily activities  6.0: Assistance required to walk and work  7.0: Essentially restricted to w/c  8.0: Restricted to bed or w/c  9.0: Bedridden or unable to effectively communicate or eat/swallow  10.0: Death    Norms:  0-4.5: Independent community ambulator  5.0-9.5: Ambulation impairments (AD required)  6.5: Non-ambulatory or w/c bound  7.0: W/C bound permanently Modified Cheryl  0: No increase in tone  1: Catch and release or min resistance at end range  1+: Catch f/b min resistance throughout remainder (< half ROM)  2: Easily moved, but more marked tone throughout most ROM  3: Significant tone, PROM difficult  4: Rigid   12 Item MS Walking Scale (MD Fanny et al, 2008)  Healthy Adults: 2.2  MS: 28.2 MS Fatigue Impact Scale  MDC: 19.3%  Norm: 33         Subjective  No pins and needles in legs, but does have pins and needles in the arms sometimes. Had a prior shoulder surgery in the right shoulder and has some tears in the left shoulder. Was diagnosed with MS in 2020.    UPDATE 24: He reports he has no sensory issues in his legs. He reports no falls in the last month. He had trouble sleeping last night and is feeling tired today.    History of Present Illness  Primary AD: primarily uses SPC (no walker anymore)  Assist level at home: Independent  WC usage: n/a  MS Type: Relapsing Remitting    Pain  Current pain ratin/10    Social Support  Steps to enter house: 4  Stairs in house: full flight   Lives in:  multistory home  Lives with: wife and daughter    Employment status: Retired  Hand dominance: right    Treatments  Previous treatment: ENT, Neurology  Current treatment: skilled Physical Therapy      Objective     LE MMT  - R Hip Flexion: 4/5   - L Hip Flexion: 4/5  - R Hip Extension: 4-/5  - L Hip Extension: 4-/5  - R Hip Abduction: 3-/5  - L Hip abduction: 3/5  - R Hip adduction: 3-/5  - L Hip adduction: 3/5  - R Knee Extension: 4/5  - L Knee Extension: 4+/5  - R Knee Flexion: 4/5   - L Knee Flexion: 4+/5  - R Ankle DF: 4/5   - L Ankle DF: 4/5  - R Ankle PF: 4-/5   - L Ankle PF: 4/5    Sensation  - Light touch: intact (slight tingling in L hand)      Outcome Measures Initial Eval  8/13 9/16    5xSTS 1: 18.07 sec  2: 16.84 sec 17.64 sec no hands    TUG  - Regular      - Cognitive   NV      15.65 sec no AD    14.34 sec no AD    10 meter NV 0.99 m/s with SPC    25 Foot Walk Test NT 8.59 sec with SPC    BEAN 36/56   44/56    30 sec STS Not tested 8    2MWT  ft with SPC    12 Item MS Walking Scale Not tested 73%    ABC 64.38% 63.75%         Precautions: standard precautions  Past Medical History:   Diagnosis Date    Arthritis 2015    In my hands, rt shoulder    Cancer (HCC) 2010    [place] twice in nose    Diabetes mellitus ([facility])     Borderline    Diverticulitis of colon 2021    Colon oscapy they found it    GERD (gastroesophageal reflux disease) 2023    [facility] did throat test, said i have    Heart murmur 1960    Told i was born with it. Had checked not pronounced    High blood pressure     2021    HL (hearing loss) 2016    From work    Hypertension 2020    Kidney stone     Had stones twice    Memory loss 2020    From ms short term loss    MS (multiple sclerosis) ([facility])     2020    Urinary tract infection     Had infection last year

## 2024-09-17 ENCOUNTER — TELEPHONE (OUTPATIENT)
Age: 64
End: 2024-09-17

## 2024-09-17 DIAGNOSIS — H91.93 HEARING DECREASED, BILATERAL: Primary | ICD-10-CM

## 2024-09-17 NOTE — TELEPHONE ENCOUNTER
Referral faxed and also called patient to make him aware. He also stated they will be sending us his report from his visit today.

## 2024-09-17 NOTE — TELEPHONE ENCOUNTER
[facility] called stating the referral that was uploaded for the patient should say Audiology    Please have Bi Brooke make the change and fax to the number below.    Please fax to [phone]

## 2024-09-18 ENCOUNTER — OFFICE VISIT (OUTPATIENT)
Age: 64
End: 2024-09-18
Payer: MEDICARE

## 2024-09-18 DIAGNOSIS — G35 MULTIPLE SCLEROSIS (HCC): Primary | ICD-10-CM

## 2024-09-18 PROCEDURE — 97530 THERAPEUTIC ACTIVITIES: CPT

## 2024-09-18 NOTE — PROGRESS NOTES
"Daily Note     Today's date: 2024  Patient name: Jesús Sainz  : 1960  MRN: [id number]  Referring provider: Michelle Teran PA-C  Dx:   Encounter Diagnosis     ICD-10-CM    1. Multiple sclerosis (HCC)  G35                    Subjective: pt reports being tired.       Objective: See treatment diary below    NMR in // bars with 4# aw BLE  Circuit training @ 3 minutes on 2 minutes off   - HKM   - Tandem ambulation on foam beam   - STS with 10# tidal tank hold, focus on 3s ecc control  - Side step foam beam + blaze pod tap outs (R purple/yellow, L blue/orange)  - Curb step sequence (L tap, R tap, alt LE step up) on 6\" - deferred   - Fwd/bwd/lat  hurdles (2) 6\" (2)9\"       Assessment: Tolerated treatment well. Pt continues to have increased difficulty with tandem walking on foam beam. Patient takes increased time to perform lateral side stepping on foam beam, taking his time to make sure he is balance prior to stepping. Continue to progress. Patient demonstrated fatigue post treatment, exhibited good technique with therapeutic exercises, and would benefit from continued PT.      Plan: Continue per plan of care.      POC expires Unit limit Auth Expiration date PT/OT + Visit Limit? Co-Insurance   24 N/a N/a BOMN 25$ Co-Pay                               Visit/Unit Tracking  - IE  9/3 9 - PN                                            "

## 2024-09-23 ENCOUNTER — OFFICE VISIT (OUTPATIENT)
Age: 64
End: 2024-09-23
Payer: MEDICARE

## 2024-09-23 DIAGNOSIS — G35 MULTIPLE SCLEROSIS (HCC): Primary | ICD-10-CM

## 2024-09-23 PROCEDURE — 97530 THERAPEUTIC ACTIVITIES: CPT

## 2024-09-23 NOTE — PROGRESS NOTES
"Daily Note     Today's date: 2024  Patient name: Jesús Sainz  : 1960  MRN: [id number]  Referring provider: Michelle Teran PA-C  Dx:   Encounter Diagnosis     ICD-10-CM    1. Multiple sclerosis (HCC)  G35                    Subjective: pt reports feeling good, patient took a mile and a half walk over the weekend and feels great.       Objective: See treatment diary below    NMR in // bars with 4# aw BLE  Circuit training @ 3 minutes on 2 minutes off   - HKM   - Tandem ambulation on foam beam   - STS with 10# tidal tank hold, focus on 3s ecc control  - Side step foam beam + blaze pod tap outs (R purple/yellow, L blue/orange)  - Curb step sequence (L tap, R tap, alt LE step up) on 6\" - deferred   - backward hurdles (2) 6\" (2)9\"  - step taps on stool        Assessment: Tolerated treatment well. Patient demonstrated improvement with tandem walking on foam beam, patient doesn't require as much UE assist in order to recover balance. Patient continues to have increased difficulty performing backwards hurdles requiring more UE assistance in order to perform. Increased patients ankle weights due continue to challenge patient. Continue to progress. Patient demonstrated fatigue post treatment, exhibited good technique with therapeutic exercises, and would benefit from continued PT.      Plan: Continue per plan of care.      POC expires Unit limit Auth Expiration date PT/OT + Visit Limit? Co-Insurance   24 N/a N/a BOMN 25$ Co-Pay                               Visit/Unit Tracking  - IE 8/21 8/26 8/28 9/3 9/5 9/11 9/16 - PN                                           "

## 2024-09-25 ENCOUNTER — OFFICE VISIT (OUTPATIENT)
Age: 64
End: 2024-09-25
Payer: MEDICARE

## 2024-09-25 DIAGNOSIS — G35 MULTIPLE SCLEROSIS (HCC): Primary | ICD-10-CM

## 2024-09-25 PROCEDURE — 97112 NEUROMUSCULAR REEDUCATION: CPT

## 2024-09-25 NOTE — PROGRESS NOTES
"Daily Note     Today's date: 2024  Patient name: Jesús Sainz  : 1960  MRN: [id number]  Referring provider: Michelle Teran PA-C  Dx:   Encounter Diagnosis     ICD-10-CM    1. Multiple sclerosis (HCC)  G35                    Subjective: pt reports feeling good, slept twice after last session      Objective: See treatment diary below    NMR in // bars with 5# aw BLE  Circuit training @ 3 minutes on 2 minutes off   - HKM   - Tandem ambulation on foam beam   - STS with 10# tidal tank hold, focus on 3s ecc control  - Side step foam beam + blaze pod tap outs (R purple/yellow, L blue/orange)  - Curb step sequence (L tap, R tap, alt LE step up) on 6\" - deferred   - backward hurdles (2) 6\" (2)9\"  - step taps on stool   -[place] step ups        Assessment: Tolerated treatment well. Patient had increased difficulty with tandem walking on foam this session experiencing more loss of balance. Patient was able to perform high knee marches this session without UE assist. Added in [place] step ups for increased challenge. Continue to progress. Patient demonstrated fatigue post treatment, exhibited good technique with therapeutic exercises, and would benefit from continued PT.      Plan: Continue per plan of care.      POC expires Unit limit Auth Expiration date PT/OT + Visit Limit? Co-Insurance   24 N/a N/a BOMN 25$ Co-Pay                               Visit/Unit Tracking  - IE 8/21 8/26 8/28 9/3 9/5 9/11 9/16 - PN                                           "

## 2024-09-27 LAB — COLOGUARD RESULT REPORTABLE: NEGATIVE

## 2024-10-18 ENCOUNTER — OFFICE VISIT (OUTPATIENT)
Dept: CARDIOLOGY CLINIC | Facility: CLINIC | Age: 64
End: 2024-10-18
Payer: MEDICARE

## 2024-10-18 VITALS
HEART RATE: 84 BPM | OXYGEN SATURATION: 96 % | DIASTOLIC BLOOD PRESSURE: 76 MMHG | WEIGHT: 251 LBS | RESPIRATION RATE: 16 BRPM | BODY MASS INDEX: 38.04 KG/M2 | SYSTOLIC BLOOD PRESSURE: 110 MMHG | HEIGHT: 68 IN

## 2024-10-18 DIAGNOSIS — E66.812 CLASS 2 OBESITY WITHOUT SERIOUS COMORBIDITY WITH BODY MASS INDEX (BMI) OF 38.0 TO 38.9 IN ADULT, UNSPECIFIED OBESITY TYPE: ICD-10-CM

## 2024-10-18 DIAGNOSIS — E78.5 HYPERLIPIDEMIA LDL GOAL <70: ICD-10-CM

## 2024-10-18 DIAGNOSIS — I10 ESSENTIAL HYPERTENSION: Primary | ICD-10-CM

## 2024-10-18 PROCEDURE — 99213 OFFICE O/P EST LOW 20 MIN: CPT | Performed by: INTERNAL MEDICINE

## 2024-10-18 RX ORDER — LOSARTAN POTASSIUM AND HYDROCHLOROTHIAZIDE 12.5; 5 MG/1; MG/1
1 TABLET ORAL DAILY
Qty: 90 TABLET | Refills: 3 | Status: SHIPPED | OUTPATIENT
Start: 2024-10-18

## 2024-10-18 RX ORDER — ATORVASTATIN CALCIUM 40 MG/1
40 TABLET, FILM COATED ORAL DAILY
Qty: 90 TABLET | Refills: 3 | Status: SHIPPED | OUTPATIENT
Start: 2024-10-18

## 2024-10-18 NOTE — PROGRESS NOTES
[facility]  [address] [place] [address]  Cardiology Follow Up    Jesús Sainz  1960  [id number]    Assessment & Plan  Essential hypertension  Blood pressures are stable.  Continue present medical therapy.  Importance of salt restriction reinforced.  Hyperlipidemia LDL goal <70  Patient is on Lipitor 40 mg p.o. daily.  Last lipid profile was good.  Continue dietary and risk factor modification.  Class 2 obesity without serious comorbidity with body mass index (BMI) of 38.0 to 38.9 in adult, unspecified obesity type    Patient has been trying to lose weight and has lost approximately 30 pounds or more after starting metformin through primary care physician.  Patient encouraged to continue to lose weight to reduce cardiovascular morbidity and mortality.        Chief Complaint   Patient presents with    Follow-up       Interval History:   Patient presents for follow-up visit.  Patient denies any history of chest pain shortness of breath.  Patient denies any history of leg edema or orthopnea PND.  No history of presyncope syncope.  Patient states compliance with the present list of medications.    Patient Active Problem List   Diagnosis    Multiple sclerosis (HCC)    Morbid obesity with BMI of 40.0-44.9, adult (HCC)    Contusion of right knee    Viral syndrome    Essential hypertension    Hyperlipidemia LDL goal <70    IFG (impaired fasting glucose)     Past Medical History:   Diagnosis Date    Arthritis 2015    In my hands, rt shoulder    Cancer (HCC) 2010    Basil twice in nose    Diabetes mellitus (HCC)     Borderline    Diverticulitis of colon 2021    Colon oscapy they found it    GERD (gastroesophageal reflux disease) 2023    [facility] did throat test, said i have    Heart murmur 1959    Told i was born with it. Had checked not pronounced    High blood pressure     2021    HL (hearing loss) 2016    From work    Hypertension 2020    Kidney stone     Had stones twice    Memory loss  2020    From ms short term loss    MS (multiple sclerosis) ([facility])         Urinary tract infection     Had infection last year     Social History     Socioeconomic History    Marital status: /Civil Union     Spouse name: Not on file    Number of children: Not on file    Years of education: Not on file    Highest education level: Not on file   Occupational History    Not on file   Tobacco Use    Smoking status: Never     Passive exposure: Past    Smokeless tobacco: Never    Tobacco comments:     Don’t smoke   Vaping Use    Vaping status: Never Used   Substance and Sexual Activity    Alcohol use: Never    Drug use: Never    Sexual activity: Yes     Partners: Female     Comment: Hysterectomy   Other Topics Concern    Not on file   Social History Narrative    Not on file     Social Determinants of Health     Financial Resource Strain: Not on file   Food Insecurity: No Food Insecurity (2024)    Hunger Vital Sign     Worried About Running Out of Food in the Last Year: Never true     Ran Out of Food in the Last Year: Never true   Transportation Needs: No Transportation Needs (2024)    PRAPARE - Transportation     Lack of Transportation (Medical): No     Lack of Transportation (Non-Medical): No   Physical Activity: Not on file   Stress: Not on file   Social Connections: Not on file   Intimate Partner Violence: Not on file   Housing Stability: Low Risk  (2024)    Housing Stability Vital Sign     Unable to Pay for Housing in the Last Year: No     Number of Times Moved in the Last Year: 1     Homeless in the Last Year: No      Family History   Problem Relation Age of Onset    Cancer Father          from clear cell carcinoma in kidney’s    Prostate cancer Father     Hypertension Father     Nephrolithiasis Father     Diabetes Mother     Hypertension Mother     Cancer Paternal Uncle         Kidney cancer    Cancer Brother         clear cell carcinma    Hypertension Sister     Diabetes Sister      Hyperlipidemia Sister     Hypertension Sister     Diabetes Sister     Hyperlipidemia Sister     No Known Problems Daughter     Bipolar disorder Sister     OCD Sister      Past Surgical History:   Procedure Laterality Date    BUNIONECTOMY Right     2002    CYST REMOVAL      1990's    FL MYELOGRAM LUMBAR  2/4/2020    NOSTRIL RETAINER INSERTION Bilateral     1990's       Current Outpatient Medications:     aspirin 81 mg chewable tablet, Chew 81 mg, Disp: , Rfl:     atorvastatin (LIPITOR) 40 mg tablet, Take 1 tablet (40 mg total) by mouth daily, Disp: 90 tablet, Rfl: 3    Blood Glucose Monitoring Suppl (OneTouch Verio Reflect) w/Device KIT, Check blood sugars once daily. Please substitute with appropriate alternative as covered by patient's insurance. Dx: E11.65, Disp: 1 kit, Rfl: 0    Ciclopirox 0.77 % gel, , Disp: , Rfl:     EPINEPHrine (EPIPEN) 0.3 mg/0.3 mL SOAJ, , Disp: , Rfl:     ergocalciferol (VITAMIN D2) 50,000 units, Take 50,000 Units by mouth, Disp: , Rfl:     Glatiramer Acetate 40 MG/ML SOSY, INJECT ONE SYRINGE SUBCUTANEOUSLY 3 TIMES A WEEK AT LEAST 48 HOURS APART. ALLOW TO WARM TO ROOM TEMP FOR 20 MINUTES. REFRIGERATE., Disp: , Rfl:     glucosamine 500 MG CAPS capsule, Take 1,000 mg by mouth daily, Disp: , Rfl:     glucose blood (OneTouch Verio) test strip, CHECK BLOOD SUGARS ONCE DAILY. (DX: E11.65), Disp: 100 strip, Rfl: 3    glucose blood (OneTouch Verio) test strip, Check blood sugars once daily. Please substitute with appropriate alternative as covered by patient's insurance. Dx: E11.65, Disp: 100 each, Rfl: 3    ketoconazole (NIZORAL) 2 % shampoo, Apply topically, Disp: , Rfl:     losartan-hydrochlorothiazide (HYZAAR) 50-12.5 mg per tablet, Take 1 tablet by mouth daily, Disp: 90 tablet, Rfl: 3    metFORMIN (GLUCOPHAGE-XR) 500 mg 24 hr tablet, Take 2 tablets (1,000 mg total) by mouth daily with dinner, Disp: 180 tablet, Rfl: 3    OneTouch Delica Lancets 33G MISC, Check blood sugars once daily. Please  "substitute with appropriate alternative as covered by patient's insurance. Dx: E11.65, Disp: 100 each, Rfl: 3    Tart Cherry 1200 MG CAPS, Take by mouth, Disp: , Rfl:     Turmeric 1053 MG TABS, Take by mouth, Disp: , Rfl:   Allergies   Allergen Reactions    Mometasone Other (See Comments)     Throat Irritation       Labs:  No visits with results within 2 Month(s) from this visit.   Latest known visit with results is:   Office Visit on 07/01/2024   Component Date Value    Cologuard Result 09/20/2024 Negative      Imaging: No results found.    Review of Systems:  Review of Systems  REVIEW OF SYSTEMS:  Constitutional:  Denies fever or chills   Eyes:  Denies change in visual acuity   HENT:  Denies nasal congestion or sore throat   Respiratory:  Denies cough or shortness of breath   Cardiovascular:  Denies chest pain or edema   GI:  Denies abdominal pain, nausea, vomiting, bloody stools or diarrhea   :  Denies dysuria, frequency, difficulty in micturition and nocturia  Musculoskeletal:  Denies back pain or joint pain   Neurologic:  Denies headache, focal weakness or sensory changes   Endocrine:  Denies polyuria or polydipsia   Lymphatic:  Denies swollen glands   Psychiatric:  Denies depression or anxiety    Physical Exam:    /76 (BP Location: Left arm, Patient Position: Sitting, Cuff Size: Standard)   Pulse 84   Resp 16   Ht 5' 8\" (1.727 m)   Wt 114 kg (251 lb)   SpO2 96%   BMI 38.16 kg/m²     Physical Exam  PHYSICAL EXAM:  General:  Patient is not in acute distress   Head: Normocephalic, Atraumatic.  HEENT:  Both pupils normal-size atraumatic, normocephalic, nonicteric  Neck:  JVP not raised. Trachea central. No carotid bruit  Respiratory:  normal breath sounds no crackles. no rhonchi  Cardiovascular:  Regular rate and rhythm no S3 no murmurs  GI:  Abdomen soft nontender. No organomegaly.   Lymphatic:  No cervical or inguinal lymphadenopathy  Neurologic:  Patient is awake alert, oriented . Grossly " nonfocal  Extremities no edema

## 2024-10-18 NOTE — ASSESSMENT & PLAN NOTE
Blood pressures are stable.  Continue present medical therapy.  Importance of salt restriction reinforced.

## 2024-10-18 NOTE — ASSESSMENT & PLAN NOTE
Patient is on Lipitor 40 mg p.o. daily.  Last lipid profile was good.  Continue dietary and risk factor modification.

## 2024-11-14 ENCOUNTER — TELEPHONE (OUTPATIENT)
Age: 64
End: 2024-11-14

## 2024-11-14 NOTE — TELEPHONE ENCOUNTER
Called spoke to patient explained will need to be r/s for 60 min appoinment, as per  for OVL, pt was driving and will call CC to reschedule.

## 2024-11-14 NOTE — TELEPHONE ENCOUNTER
----- Message from Chano Dolan MD sent at 11/11/2024 10:39 AM EST -----  Sonia Jones, No I would like 60 min please.     Thank you,     Dr. Magdaleno MD.   11/11/24   10:39 AM  ----- Message -----  From: Karen Keenan MA  Sent: 11/11/2024  12:00 AM EST  To: Chano Dolan MD    Good Morning ,  This pt was scheduled to see you on 12/5/24, last saw GLENNY Teran , this pt is scheduled for 30 min for MS, are you ok with 30 min appointment?    Karen

## 2025-05-08 ENCOUNTER — OFFICE VISIT (OUTPATIENT)
Dept: CARDIOLOGY CLINIC | Facility: CLINIC | Age: 65
End: 2025-05-08
Payer: MEDICARE

## 2025-05-08 VITALS
OXYGEN SATURATION: 96 % | BODY MASS INDEX: 40.01 KG/M2 | HEART RATE: 85 BPM | WEIGHT: 264 LBS | RESPIRATION RATE: 16 BRPM | DIASTOLIC BLOOD PRESSURE: 80 MMHG | SYSTOLIC BLOOD PRESSURE: 110 MMHG | HEIGHT: 68 IN

## 2025-05-08 DIAGNOSIS — E78.5 HYPERLIPIDEMIA LDL GOAL <70: ICD-10-CM

## 2025-05-08 DIAGNOSIS — I10 ESSENTIAL HYPERTENSION: Primary | ICD-10-CM

## 2025-05-08 DIAGNOSIS — E66.01 MORBID OBESITY WITH BMI OF 40.0-44.9, ADULT (HCC): ICD-10-CM

## 2025-05-08 PROBLEM — B34.9 VIRAL SYNDROME: Status: RESOLVED | Noted: 2023-12-27 | Resolved: 2025-05-08

## 2025-05-08 PROCEDURE — 99213 OFFICE O/P EST LOW 20 MIN: CPT | Performed by: INTERNAL MEDICINE

## 2025-05-08 NOTE — ASSESSMENT & PLAN NOTE
Continue atorvastatin 40 mg p.o. daily.  Continue diet and risk factor modification.  Patient has blood work through PCP on a regular basis.    Symptoms to watch her from cardiac standpoint which would indicate the need for further cardiac evaluation discussed.  Follow-up in 1 year.

## 2025-05-08 NOTE — PROGRESS NOTES
[facility]  [address] [place] [address]  Cardiology Follow Up    Jesús Sainz  1960  [id number]      Assessment & Plan  Morbid obesity with BMI of 40.0-44.9, adult (HCC)  Patient counseled to lose weight to reduce cardiovascular morbidity and mortality.  Essential hypertension  Blood pressures are stable.  Continue present medications.  Importance of salt restriction reinforced.  Hyperlipidemia LDL goal <70  Continue atorvastatin 40 mg p.o. daily.  Continue diet and risk factor modification.  Patient has blood work through PCP on a regular basis.    Symptoms to watch her from cardiac standpoint which would indicate the need for further cardiac evaluation discussed.  Follow-up in 1 year.       Chief Complaint   Patient presents with    Follow-up       Interval History:  Patient presents for follow-up visit.  Patient denies any history of chest pain shortness of breath.  Patient denies any history of leg edema or orthopnea PND.  No history of presyncope syncope.  Patient states compliance with the present list of medications.      Patient Active Problem List   Diagnosis    Multiple sclerosis (HCC)    Morbid obesity with BMI of 40.0-44.9, adult ([facility])    Contusion of right knee    Viral syndrome    Essential hypertension    Hyperlipidemia LDL goal <70    IFG (impaired fasting glucose)     Past Medical History:   Diagnosis Date    Arthritis 2015    In my hands, rt shoulder    Cancer (HCC) 2010    Basil twice in nose    Diabetes mellitus ([facility])     Borderline    Diverticulitis of colon 2021    Colon oscapy they found it    GERD (gastroesophageal reflux disease) 2023    [facility] did throat test, said i have    Heart murmur 1959    Told i was born with it. Had checked not pronounced    High blood pressure     2021    HL (hearing loss) 2016    From work    Hypertension 2020    Kidney stone     Had stones twice    Memory loss 2020    From ms short term loss    MS (multiple sclerosis) ([facility])          Urinary tract infection     Had infection last year     Social History     Socioeconomic History    Marital status: /Civil Union     Spouse name: Not on file    Number of children: Not on file    Years of education: Not on file    Highest education level: Not on file   Occupational History    Not on file   Tobacco Use    Smoking status: Never     Passive exposure: Past    Smokeless tobacco: Never    Tobacco comments:     Don’t smoke   Vaping Use    Vaping status: Never Used   Substance and Sexual Activity    Alcohol use: Never    Drug use: Never    Sexual activity: Yes     Partners: Female     Comment: Hysterectomy   Other Topics Concern    Not on file   Social History Narrative    Not on file     Social Drivers of Health     Financial Resource Strain: Not on file   Food Insecurity: No Food Insecurity (2024)    Nursing - Inadequate Food Risk Classification     Worried About Running Out of Food in the Last Year: Never true     Ran Out of Food in the Last Year: Never true     Ran Out of Food in the Last Year: Not on file   Transportation Needs: No Transportation Needs (2024)    PRAPARE - Transportation     Lack of Transportation (Medical): No     Lack of Transportation (Non-Medical): No   Physical Activity: Not on file   Stress: Not on file   Social Connections: Not on file   Intimate Partner Violence: Not on file   Housing Stability: Low Risk  (2024)    Housing Stability Vital Sign     Unable to Pay for Housing in the Last Year: No     Number of Times Moved in the Last Year: 1     Homeless in the Last Year: No      Family History   Problem Relation Age of Onset    Cancer Father          from clear cell carcinoma in kidney’s    Prostate cancer Father     Hypertension Father     Nephrolithiasis Father     Diabetes Mother     Hypertension Mother     Cancer Paternal Uncle         Kidney cancer    Cancer Brother         clear cell carcinma    Hypertension Sister     Diabetes Sister      Hyperlipidemia Sister     Hypertension Sister     Diabetes Sister     Hyperlipidemia Sister     No Known Problems Daughter     Bipolar disorder Sister     OCD Sister      Past Surgical History:   Procedure Laterality Date    BUNIONECTOMY Right     2002    CYST REMOVAL      1990's    FL MYELOGRAM LUMBAR  2/4/2020    NOSTRIL RETAINER INSERTION Bilateral     1990's       Current Outpatient Medications:     aspirin 81 mg chewable tablet, Chew 81 mg daily, Disp: , Rfl:     atorvastatin (LIPITOR) 40 mg tablet, Take 1 tablet (40 mg total) by mouth daily, Disp: 90 tablet, Rfl: 3    Blood Glucose Monitoring Suppl (OneTouch Verio Reflect) w/Device KIT, Check blood sugars once daily. Please substitute with appropriate alternative as covered by patient's insurance. Dx: E11.65, Disp: 1 kit, Rfl: 0    Ciclopirox 0.77 % gel, , Disp: , Rfl:     EPINEPHrine (EPIPEN) 0.3 mg/0.3 mL SOAJ, , Disp: , Rfl:     ergocalciferol (VITAMIN D2) 50,000 units, Take 50,000 Units by mouth, Disp: , Rfl:     Glatiramer Acetate 40 MG/ML SOSY, INJECT ONE SYRINGE SUBCUTANEOUSLY 3 TIMES A WEEK AT LEAST 48 HOURS APART. ALLOW TO WARM TO ROOM TEMP FOR 20 MINUTES. REFRIGERATE., Disp: , Rfl:     glucosamine 500 MG CAPS capsule, Take 1,000 mg by mouth daily, Disp: , Rfl:     glucose blood (OneTouch Verio) test strip, CHECK BLOOD SUGARS ONCE DAILY. (DX: E11.65), Disp: 100 strip, Rfl: 3    glucose blood (OneTouch Verio) test strip, Check blood sugars once daily. Please substitute with appropriate alternative as covered by patient's insurance. Dx: E11.65, Disp: 100 each, Rfl: 3    losartan-hydrochlorothiazide (HYZAAR) 50-12.5 mg per tablet, Take 1 tablet by mouth daily, Disp: 90 tablet, Rfl: 3    metFORMIN (GLUCOPHAGE-XR) 500 mg 24 hr tablet, Take 2 tablets (1,000 mg total) by mouth daily with dinner, Disp: 180 tablet, Rfl: 3    OneTouch Delica Lancets 33G MISC, Check blood sugars once daily. Please substitute with appropriate alternative as covered by patient's  "insurance. Dx: E11.65, Disp: 100 each, Rfl: 3    Tart Cherry 1200 MG CAPS, Take by mouth, Disp: , Rfl:     Turmeric 1053 MG TABS, Take by mouth, Disp: , Rfl:   Allergies   Allergen Reactions    Mometasone Other (See Comments)     Throat Irritation       Labs:  No visits with results within 2 Month(s) from this visit.   Latest known visit with results is:   Office Visit on 07/01/2024   Component Date Value    Cologuard Result 09/20/2024 Negative      Imaging: No results found.    Review of Systems:  Review of Systems  REVIEW OF SYSTEMS:  Constitutional:  Denies fever or chills   Eyes:  Denies change in visual acuity   HENT:  Denies nasal congestion or sore throat   Respiratory:  Denies cough or shortness of breath   Cardiovascular:  Denies chest pain or edema   GI:  Denies abdominal pain, nausea, vomiting, bloody stools or diarrhea   :  Denies dysuria, frequency, difficulty in micturition and nocturia  Musculoskeletal:  Denies back pain or joint pain   Neurologic:  Denies headache, focal weakness or sensory changes   Endocrine:  Denies polyuria or polydipsia   Lymphatic:  Denies swollen glands   Psychiatric:  Denies depression or anxiety    Physical Exam:    /80 (BP Location: Left arm, Patient Position: Sitting, Cuff Size: Standard)   Pulse 85   Resp 16   Ht 5' 8\" (1.727 m)   Wt 120 kg (264 lb)   SpO2 96%   BMI 40.14 kg/m²     Physical Exam  PHYSICAL EXAM:  General:  Patient is not in acute distress   Head: Normocephalic, Atraumatic.  HEENT:  Both pupils normal-size atraumatic, normocephalic, nonicteric  Neck:  JVP not raised. Trachea central. No carotid bruit  Respiratory:  normal breath sounds no crackles. no rhonchi  Cardiovascular:  Regular rate and rhythm no S3 no murmurs  GI:  Abdomen soft nontender. No organomegaly.   Lymphatic:  No cervical or inguinal lymphadenopathy  Neurologic:  Patient is awake alert, oriented . Grossly nonfocal  Extremities no edema      "

## 2025-05-26 DIAGNOSIS — R73.01 IFG (IMPAIRED FASTING GLUCOSE): ICD-10-CM

## 2025-05-27 RX ORDER — METFORMIN HYDROCHLORIDE 500 MG/1
500 TABLET, EXTENDED RELEASE ORAL
Qty: 30 TABLET | Refills: 0 | Status: SHIPPED | OUTPATIENT
Start: 2025-05-27

## 2025-05-27 NOTE — TELEPHONE ENCOUNTER
Patient needs updated blood work. Please place orders. A courtesy refill was provided.     Pt needs cbc,cmp,lipids , A1c       Patient is scheduled for an appointment. Courtesy refill provided.  6/9/25

## 2025-06-02 ENCOUNTER — RA CDI HCC (OUTPATIENT)
Dept: OTHER | Facility: HOSPITAL | Age: 65
End: 2025-06-02

## 2025-06-02 NOTE — PROGRESS NOTES
HCC coding opportunities          Chart Reviewed number of suggestions sent to Provider: 1     Patients Insurance     Medicare Insurance: Medicare        E11.9

## 2025-06-09 ENCOUNTER — APPOINTMENT (OUTPATIENT)
Dept: LAB | Facility: HOSPITAL | Age: 65
End: 2025-06-09
Payer: MEDICARE

## 2025-06-09 ENCOUNTER — OFFICE VISIT (OUTPATIENT)
Dept: FAMILY MEDICINE CLINIC | Facility: CLINIC | Age: 65
End: 2025-06-09
Payer: MEDICARE

## 2025-06-09 VITALS
SYSTOLIC BLOOD PRESSURE: 126 MMHG | HEIGHT: 68 IN | BODY MASS INDEX: 40.25 KG/M2 | WEIGHT: 265.6 LBS | TEMPERATURE: 97.3 F | OXYGEN SATURATION: 95 % | DIASTOLIC BLOOD PRESSURE: 84 MMHG | HEART RATE: 85 BPM

## 2025-06-09 DIAGNOSIS — I10 ESSENTIAL HYPERTENSION: ICD-10-CM

## 2025-06-09 DIAGNOSIS — R73.01 IFG (IMPAIRED FASTING GLUCOSE): ICD-10-CM

## 2025-06-09 DIAGNOSIS — Z00.00 MEDICARE ANNUAL WELLNESS VISIT, SUBSEQUENT: ICD-10-CM

## 2025-06-09 DIAGNOSIS — G35 MULTIPLE SCLEROSIS (HCC): ICD-10-CM

## 2025-06-09 DIAGNOSIS — E78.5 HYPERLIPIDEMIA LDL GOAL <70: ICD-10-CM

## 2025-06-09 DIAGNOSIS — Z00.00 MEDICARE ANNUAL WELLNESS VISIT, SUBSEQUENT: Primary | ICD-10-CM

## 2025-06-09 DIAGNOSIS — Z99.89 DEPENDENCE ON CANE: ICD-10-CM

## 2025-06-09 LAB
ALBUMIN SERPL BCG-MCNC: 4.1 G/DL (ref 3.5–5)
ALP SERPL-CCNC: 56 U/L (ref 34–104)
ALT SERPL W P-5'-P-CCNC: 16 U/L (ref 7–52)
ANION GAP SERPL CALCULATED.3IONS-SCNC: 6 MMOL/L (ref 4–13)
AST SERPL W P-5'-P-CCNC: 14 U/L (ref 13–39)
BACTERIA UR QL AUTO: ABNORMAL /HPF
BILIRUB SERPL-MCNC: 0.86 MG/DL (ref 0.2–1)
BILIRUB UR QL STRIP: NEGATIVE
BUN SERPL-MCNC: 20 MG/DL (ref 5–25)
CALCIUM SERPL-MCNC: 9.5 MG/DL (ref 8.4–10.2)
CHLORIDE SERPL-SCNC: 105 MMOL/L (ref 96–108)
CHOLEST SERPL-MCNC: 141 MG/DL (ref ?–200)
CLARITY UR: CLEAR
CO2 SERPL-SCNC: 28 MMOL/L (ref 21–32)
COLOR UR: ABNORMAL
CREAT SERPL-MCNC: 0.79 MG/DL (ref 0.6–1.3)
CREAT UR-MCNC: 117 MG/DL
GFR SERPL CREATININE-BSD FRML MDRD: 94 ML/MIN/1.73SQ M
GLUCOSE P FAST SERPL-MCNC: 127 MG/DL (ref 65–99)
GLUCOSE UR STRIP-MCNC: NEGATIVE MG/DL
HDLC SERPL-MCNC: 43 MG/DL
HGB UR QL STRIP.AUTO: NEGATIVE
HYALINE CASTS #/AREA URNS LPF: ABNORMAL /LPF
KETONES UR STRIP-MCNC: NEGATIVE MG/DL
LDLC SERPL CALC-MCNC: 75 MG/DL (ref 0–100)
LEUKOCYTE ESTERASE UR QL STRIP: ABNORMAL
MICROALBUMIN UR-MCNC: <7 MG/L
MUCOUS THREADS UR QL AUTO: ABNORMAL
NITRITE UR QL STRIP: NEGATIVE
NON-SQ EPI CELLS URNS QL MICRO: ABNORMAL /HPF
PH UR STRIP.AUTO: 5.5 [PH]
POTASSIUM SERPL-SCNC: 4.2 MMOL/L (ref 3.5–5.3)
PROT SERPL-MCNC: 7.2 G/DL (ref 6.4–8.4)
PROT UR STRIP-MCNC: NEGATIVE MG/DL
RBC #/AREA URNS AUTO: ABNORMAL /HPF
SODIUM SERPL-SCNC: 139 MMOL/L (ref 135–147)
SP GR UR STRIP.AUTO: 1.02 (ref 1–1.03)
TRIGL SERPL-MCNC: 114 MG/DL (ref ?–150)
TSH SERPL DL<=0.05 MIU/L-ACNC: 2.54 UIU/ML (ref 0.45–4.5)
UROBILINOGEN UR STRIP-ACNC: <2 MG/DL
WBC #/AREA URNS AUTO: ABNORMAL /HPF

## 2025-06-09 PROCEDURE — 82570 ASSAY OF URINE CREATININE: CPT

## 2025-06-09 PROCEDURE — 36415 COLL VENOUS BLD VENIPUNCTURE: CPT

## 2025-06-09 PROCEDURE — 82043 UR ALBUMIN QUANTITATIVE: CPT

## 2025-06-09 PROCEDURE — 84443 ASSAY THYROID STIM HORMONE: CPT

## 2025-06-09 PROCEDURE — 80061 LIPID PANEL: CPT

## 2025-06-09 PROCEDURE — 99214 OFFICE O/P EST MOD 30 MIN: CPT | Performed by: FAMILY MEDICINE

## 2025-06-09 PROCEDURE — G2211 COMPLEX E/M VISIT ADD ON: HCPCS | Performed by: FAMILY MEDICINE

## 2025-06-09 PROCEDURE — G0439 PPPS, SUBSEQ VISIT: HCPCS | Performed by: FAMILY MEDICINE

## 2025-06-09 PROCEDURE — 81001 URINALYSIS AUTO W/SCOPE: CPT

## 2025-06-09 PROCEDURE — 80053 COMPREHEN METABOLIC PANEL: CPT

## 2025-06-09 RX ORDER — BLOOD-GLUCOSE METER
KIT MISCELLANEOUS
Qty: 1 KIT | Refills: 0 | Status: SHIPPED | OUTPATIENT
Start: 2025-06-09

## 2025-06-09 RX ORDER — LANCETS 33 GAUGE
EACH MISCELLANEOUS
Qty: 100 EACH | Refills: 3 | Status: SHIPPED | OUTPATIENT
Start: 2025-06-09

## 2025-06-09 RX ORDER — BLOOD SUGAR DIAGNOSTIC
STRIP MISCELLANEOUS
Qty: 100 EACH | Refills: 3 | Status: SHIPPED | OUTPATIENT
Start: 2025-06-09

## 2025-06-09 NOTE — ASSESSMENT & PLAN NOTE
Orders:    Blood Glucose Monitoring Suppl (OneTouch Verio Reflect) w/Device KIT; Check blood sugars once daily. Please substitute with appropriate alternative as covered by patient's insurance. Dx: E11.65    glucose blood (OneTouch Verio) test strip; Check blood sugars once daily. Please substitute with appropriate alternative as covered by patient's insurance. Dx: E11.65    OneTouch Delica Lancets 33G MISC; Check blood sugars once daily. Please substitute with appropriate alternative as covered by patient's insurance. Dx: E11.65

## 2025-06-09 NOTE — PROGRESS NOTES
Name: Jesús Sainz      : 1960      MRN: [id number]  Encounter Provider: OSKAR Swan  Encounter Date: 2025   Encounter department: [facility] [address]  :  Assessment & Plan  Medicare annual wellness visit, subsequent    Orders:    Lipid Panel with Direct LDL reflex; Future    Essential hypertension  Stable within parameters continue current medications Hyzaar  Orders:    Comprehensive metabolic panel; Future    TSH, 3rd generation; Future    Albumin / creatinine urine ratio; Future    Multiple sclerosis (HCC)  Stable continue care with [facility]       IFG (impaired fasting glucose)    Orders:    Blood Glucose Monitoring Suppl (OneTouch Verio Reflect) w/Device KIT; Check blood sugars once daily. Please substitute with appropriate alternative as covered by patient's insurance. Dx: E11.65    glucose blood (OneTouch Verio) test strip; Check blood sugars once daily. Please substitute with appropriate alternative as covered by patient's insurance. Dx: E11.65    OneTouch Delica Lancets 33G MISC; Check blood sugars once daily. Please substitute with appropriate alternative as covered by patient's insurance. Dx: E11.65    Hyperlipidemia LDL goal <70  Tolerate statin obtain updated lipid profile           Depression Screening and Follow-up Plan: Patient was screened for depression during today's encounter. They screened negative with a PHQ-2 score of 0.        Preventive health issues were discussed with patient, and age appropriate screening tests were ordered as noted in patient's After Visit Summary. Personalized health advice and appropriate referrals for health education or preventive services given if needed, as noted in patient's After Visit Summary.    History of Present Illness     F/u   MS , followed by [facility] , will be changing medication   Doing well , neg changes   Diabetes , metformin 500mg 2 tabs qd ,   Bs monitoring neg hypoglycemia          Patient Care  Team:  OSKAR Swan as PCP - General (Family Medicine)  OSKAR Swan (Family Medicine)    Review of Systems   Constitutional:  Negative for appetite change, chills, fever and unexpected weight change.   HENT:  Negative for congestion, dental problem, ear pain, hearing loss, postnasal drip, rhinorrhea, sinus pressure, sinus pain, sneezing, sore throat, tinnitus and voice change.    Eyes:  Negative for visual disturbance.   Respiratory:  Negative for apnea, cough, chest tightness and shortness of breath.    Cardiovascular:  Negative for chest pain, palpitations and leg swelling.   Gastrointestinal:  Negative for abdominal pain, blood in stool, constipation, diarrhea, nausea and vomiting.   Endocrine: Negative for cold intolerance, heat intolerance, polydipsia, polyphagia and polyuria.   Genitourinary:  Negative for decreased urine volume, difficulty urinating, dysuria, frequency and hematuria.   Musculoskeletal:  Negative for arthralgias, back pain, gait problem, joint swelling and myalgias.   Skin:  Negative for color change, rash and wound.   Allergic/Immunologic: Negative for environmental allergies and food allergies.   Neurological:  Negative for dizziness, syncope, weakness, light-headedness, numbness and headaches.   Hematological:  Negative for adenopathy. Does not bruise/bleed easily.   Psychiatric/Behavioral:  Negative for sleep disturbance and suicidal ideas. The patient is not nervous/anxious.      Medical History Reviewed by provider this encounter:       Annual Wellness Visit Questionnaire   Last Medicare Wellness visit information reviewed, patient interviewed, no change since last AWV.     Health Risk Assessment:   Patient rates overall health as good. Patient feels that their physical health rating is slightly worse. Patient is satisfied with their life. Eyesight was rated as same. Hearing was rated as slightly worse. Patient feels that their emotional and mental health rating is same.  Patients states they are never, rarely angry. Patient states they are often unusually tired/fatigued. Pain experienced in the last 7 days has been some. Patient's pain rating has been 7/10. Patient states that he has experienced weight loss or gain in last 6 months.     Depression Screening:   PHQ-2 Score: 0      Fall Risk Screening:   In the past year, patient has experienced: history of falling in past year      Home Safety:  Patient does not have trouble with stairs inside or outside of their home. Patient has working smoke alarms and has working carbon monoxide detector. Home safety hazards include: none.     Nutrition:   Current diet is Diabetic.     Medications:   Patient is currently taking over-the-counter supplements. OTC medications include: see medication list. Patient is able to manage medications.     Activities of Daily Living (ADLs)/Instrumental Activities of Daily Living (IADLs):   Walk and transfer into and out of bed and chair?: Yes  Dress and groom yourself?: Yes    Bathe or shower yourself?: Yes    Feed yourself? Yes  Do your laundry/housekeeping?: Yes  Manage your money, pay your bills and track your expenses?: Yes  Make your own meals?: Yes    Do your own shopping?: Yes    Durable Medical Equipment Suppliers  Cane, walker if needed    Previous Hospitalizations:   Any hospitalizations or ED visits within the last 12 months?: No      Advance Care Planning:   Living will: No    Durable POA for healthcare: Yes    Advanced directive: Yes      Preventive Screenings      Cardiovascular Screening:    General: Screening Not Indicated and History Lipid Disorder      Colorectal Cancer Screening:     General: Screening Current      Abdominal Aortic Aneurysm (AAA) Screening:    Risk factors include: age between 65-76 yo        Lung Cancer Screening:     General: Screening Not Indicated    Screening, Brief Intervention, and Referral to Treatment (SBIRT)     Screening  Typical number of drinks in a day:  "0  Typical number of drinks in a week: 0  Interpretation: Low risk drinking behavior.    AUDIT-C Screenin) How often did you have a drink containing alcohol in the past year? never  2) How many drinks did you have on a typical day when you were drinking in the past year? 0  3) How often did you have 6 or more drinks on one occasion in the past year? never    AUDIT-C Score: 0  Interpretation: Score 0-3 (male): Negative screen for alcohol misuse    Single Item Drug Screening:  How often have you used an illegal drug (including marijuana) or a prescription medication for non-medical reasons in the past year? never    Single Item Drug Screen Score: 0  Interpretation: Negative screen for possible drug use disorder    Social Drivers of Health     Food Insecurity: Patient Declined (2025)    Nursing - Inadequate Food Risk Classification     Worried About Running Out of Food in the Last Year: Patient declined     Ran Out of Food in the Last Year: Patient declined   Transportation Needs: Patient Declined (2025)    PRAPARE - Transportation     Lack of Transportation (Medical): Patient declined     Lack of Transportation (Non-Medical): Patient declined   Housing Stability: Patient Declined (2025)    Housing Stability Vital Sign     Unable to Pay for Housing in the Last Year: Patient declined     Number of Times Moved in the Last Year: 0     Homeless in the Last Year: Patient declined   Utilities: Patient Declined (2025)    [facility] Utilities     Threatened with loss of utilities: Patient declined     No results found.    Objective   /84 (BP Location: Left arm, Patient Position: Sitting)   Pulse 85   Temp (!) 97.3 °F (36.3 °C)   Ht 5' 8\" (1.727 m)   Wt 120 kg (265 lb 9.6 oz)   SpO2 95%   BMI 40.38 kg/m²     Physical Exam  Constitutional:       General: He is not in acute distress.     Appearance: He is well-developed. He is not ill-appearing or toxic-appearing.   HENT:      Head: Normocephalic and " atraumatic.   Neck:      Vascular: No carotid bruit.     Cardiovascular:      Rate and Rhythm: Normal rate and regular rhythm.      Heart sounds: Normal heart sounds.   Pulmonary:      Effort: Pulmonary effort is normal.      Breath sounds: Normal breath sounds.   Abdominal:      General: Bowel sounds are normal.      Palpations: Abdomen is soft.     Musculoskeletal:         General: Normal range of motion.      Cervical back: Normal range of motion and neck supple.   Lymphadenopathy:      Cervical: No cervical adenopathy.     Skin:     General: Skin is warm and dry.     Neurological:      Mental Status: He is alert and oriented to person, place, and time.      Deep Tendon Reflexes: Reflexes are normal and symmetric.     Psychiatric:         Behavior: Behavior normal.         Thought Content: Thought content normal.         Judgment: Judgment normal.

## 2025-06-09 NOTE — ASSESSMENT & PLAN NOTE
Stable continue care with [facility]        no diarrhea/no flatulence/no nausea/no vomiting/no change in bowel habits/no abdominal pain/no constipation

## 2025-06-09 NOTE — ASSESSMENT & PLAN NOTE
Stable within parameters continue current medications Hyzaar  Orders:    Comprehensive metabolic panel; Future    TSH, 3rd generation; Future    Albumin / creatinine urine ratio; Future

## 2025-06-10 ENCOUNTER — TELEPHONE (OUTPATIENT)
Age: 65
End: 2025-06-10

## 2025-06-10 NOTE — TELEPHONE ENCOUNTER
[facility] called to follow up on lab results. Stated they received results on [employer] but don't understand results. Advised as soon as provider reviews results they will received a call.     Please advise, thank you.

## 2025-06-12 ENCOUNTER — TELEPHONE (OUTPATIENT)
Dept: FAMILY MEDICINE CLINIC | Facility: CLINIC | Age: 65
End: 2025-06-12

## 2025-06-12 DIAGNOSIS — R73.01 IFG (IMPAIRED FASTING GLUCOSE): ICD-10-CM

## 2025-06-12 RX ORDER — METFORMIN HYDROCHLORIDE 500 MG/1
1000 TABLET, EXTENDED RELEASE ORAL
Qty: 60 TABLET | Refills: 5 | Status: SHIPPED | OUTPATIENT
Start: 2025-06-12

## 2025-06-12 NOTE — TELEPHONE ENCOUNTER
Wife and patient called in to check on status of receiving results from provider. Patient states he is concerned, because there is a lot of abnormal results and he has hx of cancers in his family. PCP please call patient back to further advise.  Thank you

## 2025-06-12 NOTE — TELEPHONE ENCOUNTER
Lab results and provider message/recommendations relayed to patient.  Pt verbalized understanding.  No further questions or concerns at this time.  Pt confirmed he is taking his Metformin as ordered.  Confirmed preferred pharmacy in case provider decides to change medication regimen.

## 2025-06-12 NOTE — TELEPHONE ENCOUNTER
----- Message from OSKAR Swan sent at 6/12/2025 10:44 AM EDT -----  Regarding: labs  Inform pt Glucose brushing into the diabetic zone Meds? Is he taking the metformin ? Confirm dosing Kideny / liver function wnl U/a contamination , no apparent infective process

## 2025-06-13 NOTE — TELEPHONE ENCOUNTER
Called patient and he said he was already taking 2 tablets with dinner so really there is no change then?

## 2025-06-16 DIAGNOSIS — R73.01 IFG (IMPAIRED FASTING GLUCOSE): ICD-10-CM

## 2025-07-11 ENCOUNTER — CLINICAL SUPPORT (OUTPATIENT)
Dept: FAMILY MEDICINE CLINIC | Facility: CLINIC | Age: 65
End: 2025-07-11
Payer: MEDICARE

## 2025-07-11 DIAGNOSIS — Z20.822 COVID-19 RULED OUT: Primary | ICD-10-CM

## 2025-07-11 LAB
SARS-COV-2 AG UPPER RESP QL IA: NEGATIVE
VALID CONTROL: NORMAL

## 2025-07-11 PROCEDURE — 87811 SARS-COV-2 COVID19 W/OPTIC: CPT
